# Patient Record
Sex: FEMALE | Race: WHITE | Employment: UNEMPLOYED | URBAN - METROPOLITAN AREA
[De-identification: names, ages, dates, MRNs, and addresses within clinical notes are randomized per-mention and may not be internally consistent; named-entity substitution may affect disease eponyms.]

---

## 2018-08-07 ENCOUNTER — HOSPITAL ENCOUNTER (EMERGENCY)
Age: 38
Discharge: HOME | End: 2018-08-07
Attending: EMERGENCY MEDICINE
Payer: COMMERCIAL

## 2018-08-07 VITALS
RESPIRATION RATE: 20 BRPM | OXYGEN SATURATION: 100 % | TEMPERATURE: 97.9 F | BODY MASS INDEX: 23.73 KG/M2 | HEIGHT: 57 IN | HEART RATE: 62 BPM | WEIGHT: 110 LBS | SYSTOLIC BLOOD PRESSURE: 113 MMHG | DIASTOLIC BLOOD PRESSURE: 67 MMHG

## 2018-08-07 DIAGNOSIS — L02.91 ABSCESS: Primary | ICD-10-CM

## 2018-08-07 PROCEDURE — 99282 EMERGENCY DEPT VISIT SF MDM: CPT

## 2018-08-07 PROCEDURE — 10060 I&D ABSCESS SIMPLE/SINGLE: CPT

## 2018-08-07 PROCEDURE — 6370000000 HC RX 637 (ALT 250 FOR IP)

## 2018-08-07 RX ORDER — LIDOCAINE AND PRILOCAINE 25; 25 MG/G; MG/G
CREAM TOPICAL ONCE
Status: DISCONTINUED | OUTPATIENT
Start: 2018-08-07 | End: 2018-08-07 | Stop reason: CLARIF

## 2018-08-07 RX ORDER — LIDOCAINE AND PRILOCAINE 25; 25 MG/G; MG/G
CREAM TOPICAL ONCE
Status: DISCONTINUED | OUTPATIENT
Start: 2018-08-07 | End: 2018-08-07 | Stop reason: HOSPADM

## 2018-08-07 RX ORDER — BUPRENORPHINE AND NALOXONE 8; 2 MG/1; MG/1
1 FILM, SOLUBLE BUCCAL; SUBLINGUAL DAILY
COMMUNITY
End: 2018-08-07 | Stop reason: ALTCHOICE

## 2018-08-07 RX ORDER — LIDOCAINE AND PRILOCAINE 25; 25 MG/G; MG/G
CREAM TOPICAL
Status: COMPLETED
Start: 2018-08-07 | End: 2018-08-07

## 2018-08-07 RX ORDER — SULFAMETHOXAZOLE AND TRIMETHOPRIM 800; 160 MG/1; MG/1
1 TABLET ORAL 2 TIMES DAILY
Qty: 20 TABLET | Refills: 0 | Status: SHIPPED | OUTPATIENT
Start: 2018-08-07 | End: 2018-08-17

## 2018-08-07 RX ORDER — BUPRENORPHINE HYDROCHLORIDE AND NALOXONE HYDROCHLORIDE DIHYDRATE 8; 2 MG/1; MG/1
1.5 TABLET SUBLINGUAL DAILY
COMMUNITY
End: 2022-08-21 | Stop reason: ALTCHOICE

## 2018-08-07 RX ORDER — CEPHALEXIN 250 MG/1
500 CAPSULE ORAL 4 TIMES DAILY
Qty: 80 CAPSULE | Refills: 0 | Status: SHIPPED | OUTPATIENT
Start: 2018-08-07 | End: 2018-08-17

## 2018-08-07 RX ADMIN — LIDOCAINE AND PRILOCAINE 1 AMPULE: 25; 25 CREAM TOPICAL at 08:48

## 2018-08-07 ASSESSMENT — PAIN DESCRIPTION - PAIN TYPE: TYPE: ACUTE PAIN

## 2018-08-07 ASSESSMENT — ENCOUNTER SYMPTOMS
PHOTOPHOBIA: 0
SHORTNESS OF BREATH: 0
SORE THROAT: 0
WHEEZING: 0
SINUS PRESSURE: 0
CONSTIPATION: 0
COUGH: 0
VOMITING: 0
DIARRHEA: 0
SINUS PAIN: 0
ABDOMINAL PAIN: 0
NAUSEA: 0
BACK PAIN: 0
RHINORRHEA: 0

## 2018-08-07 ASSESSMENT — PAIN SCALES - GENERAL: PAINLEVEL_OUTOF10: 6

## 2018-08-07 ASSESSMENT — PAIN DESCRIPTION - ORIENTATION: ORIENTATION: RIGHT

## 2018-08-07 ASSESSMENT — PAIN SCALES - WONG BAKER: WONGBAKER_NUMERICALRESPONSE: 6

## 2018-08-07 ASSESSMENT — PAIN DESCRIPTION - DESCRIPTORS: DESCRIPTORS: ACHING

## 2018-08-07 ASSESSMENT — PAIN DESCRIPTION - LOCATION: LOCATION: WRIST

## 2018-08-07 NOTE — ED PROVIDER NOTES
Patient is a 44 y/o female who presents with a chief complaint of abscess. Patient admits to a history of IVDA, she last injected 2 days ago into her right firearm. She now has an abscess of the right distal forearm. Patient states it has been present for 2 days and has increased in size and has more pain. Patient denies any fevers, chills, paresthesias of the right arm. She has not taken anything for the pain. The history is provided by the patient. No  was used. Review of Systems   Constitutional: Negative for chills, fatigue and fever. HENT: Negative for congestion, rhinorrhea, sinus pain, sinus pressure, sneezing and sore throat. Eyes: Negative for photophobia and visual disturbance. Respiratory: Negative for cough, shortness of breath and wheezing. Cardiovascular: Negative for chest pain and palpitations. Gastrointestinal: Negative for abdominal pain, constipation, diarrhea, nausea and vomiting. Genitourinary: Negative for dysuria, frequency and hematuria. Musculoskeletal: Negative for back pain, neck pain and neck stiffness. Skin: Positive for wound. Neurological: Negative for dizziness, light-headedness and headaches. Psychiatric/Behavioral: Negative for agitation, behavioral problems and confusion. Physical Exam   Constitutional: She is oriented to person, place, and time. She appears well-developed and well-nourished. No distress. HENT:   Head: Normocephalic and atraumatic. Mouth/Throat: Oropharynx is clear and moist. No oropharyngeal exudate. Eyes: Conjunctivae are normal. Pupils are equal, round, and reactive to light. Right eye exhibits no discharge. Left eye exhibits no discharge. No scleral icterus. Neck: Normal range of motion. Neck supple. Cardiovascular: Normal rate, regular rhythm and normal heart sounds. Exam reveals no gallop. No murmur heard.   Pulmonary/Chest: Effort normal and breath sounds normal. No respiratory discussed in detail and they are aware of the specific conditions for emergent return, as well as the importance of follow-up. MDM:        New Prescriptions    CEPHALEXIN (KEFLEX) 250 MG CAPSULE    Take 2 capsules by mouth 4 times daily for 10 days    SULFAMETHOXAZOLE-TRIMETHOPRIM (BACTRIM DS) 800-160 MG PER TABLET    Take 1 tablet by mouth 2 times daily for 10 days       Diagnosis:  1. Abscess        Disposition:  Patient's disposition: Discharge to home  Patient's condition is stable. ATTENDING PROVIDER ATTESTATION:     I have personally performed and/or participated in the history, exam, medical decision making, and procedures and agree with all pertinent clinical information unless otherwise noted. I have also reviewed and agree with the past medical, family and social history unless otherwise noted. I have discussed this patient in detail with the resident and provided the instruction and education regarding the evidence-based evaluation and treatment of Other (right wrist swelling and redness possible cyst.)    History: patient presents with abscess overlying the dorsal right wrist.  Patient admits to IV drug abuse. She denies fevers or chills. My findings: Angel Fallon is a 45 y.o. female whom is in no distress. Physical exam reveals 2cm x 2 cm raised erythematous fluctuant abscess of the distal right wrist.  No erythema tracing up the arm. Heart RRR, lungs CTA. No murmurs. My plan: Symptomatic and supportive care. Abscess drained and patient started on antibiotics. She is to have close outpatient follow up.     Electronically signed by Peter Barnett DO on 8/7/18 at 10:14 AM             Peter Barnett DO  08/07/18 0909

## 2018-09-16 ENCOUNTER — HOSPITAL ENCOUNTER (EMERGENCY)
Age: 38
Discharge: HOME | End: 2018-09-16
Attending: EMERGENCY MEDICINE
Payer: COMMERCIAL

## 2018-09-16 VITALS
SYSTOLIC BLOOD PRESSURE: 107 MMHG | BODY MASS INDEX: 23.95 KG/M2 | DIASTOLIC BLOOD PRESSURE: 70 MMHG | HEIGHT: 57 IN | RESPIRATION RATE: 16 BRPM | HEART RATE: 88 BPM | WEIGHT: 111 LBS | TEMPERATURE: 97.9 F | OXYGEN SATURATION: 97 %

## 2018-09-16 DIAGNOSIS — L02.212 ABSCESS OF BACK: Primary | ICD-10-CM

## 2018-09-16 PROCEDURE — 99282 EMERGENCY DEPT VISIT SF MDM: CPT

## 2018-09-16 PROCEDURE — G0381 LEV 2 HOSP TYPE B ED VISIT: HCPCS

## 2018-09-16 RX ORDER — IBUPROFEN 600 MG/1
600 TABLET ORAL EVERY 8 HOURS PRN
Qty: 30 TABLET | Refills: 0 | Status: SHIPPED | OUTPATIENT
Start: 2018-09-16 | End: 2022-08-21 | Stop reason: ALTCHOICE

## 2018-09-16 RX ORDER — DOXYCYCLINE 100 MG/1
100 CAPSULE ORAL 2 TIMES DAILY
Qty: 20 CAPSULE | Refills: 0 | Status: SHIPPED | OUTPATIENT
Start: 2018-09-16 | End: 2022-08-21 | Stop reason: ALTCHOICE

## 2018-09-16 ASSESSMENT — PAIN DESCRIPTION - DESCRIPTORS: DESCRIPTORS: ACHING;THROBBING;SORE;TENDER

## 2018-09-16 ASSESSMENT — PAIN SCALES - GENERAL: PAINLEVEL_OUTOF10: 5

## 2018-09-16 ASSESSMENT — PAIN DESCRIPTION - LOCATION: LOCATION: BACK

## 2018-09-16 ASSESSMENT — PAIN DESCRIPTION - FREQUENCY: FREQUENCY: CONTINUOUS

## 2018-09-16 ASSESSMENT — PAIN DESCRIPTION - PROGRESSION: CLINICAL_PROGRESSION: NOT CHANGED

## 2018-09-16 ASSESSMENT — PAIN DESCRIPTION - PAIN TYPE: TYPE: SUPERFICIAL SOMATIC PAIN

## 2018-09-16 ASSESSMENT — PAIN DESCRIPTION - ONSET: ONSET: ON-GOING

## 2018-09-16 NOTE — ED PROVIDER NOTES
HPI:  9/16/18,   Time: 7:12 PM         Blanche Chu is a 45 y.o. female presenting to the ED for redness and swelling on left side of upper back that has drained, beginning 4 days ago. The complaint has been persistent, moderate in severity, and worsened by nothing. ROS:   Pertinent positives and negatives are stated within HPI, all other systems reviewed and are negative.  --------------------------------------------- PAST HISTORY ---------------------------------------------  Past Medical History:  has a past medical history of Hyperlipemia; Substance abuse; and Varicose vein. Past Surgical History:  has a past surgical history that includes Tubal ligation (2013 March). Social History:  reports that she has been smoking Cigarettes. She has a 7.00 pack-year smoking history. She uses smokeless tobacco. She reports that she uses drugs. She reports that she does not drink alcohol. Family History: family history includes COPD in her paternal grandfather; Diabetes in her paternal grandmother and other family members; Heart Disease in her father. The patients home medications have been reviewed. Allergies: Patient has no known allergies. -------------------------------------------------- RESULTS -------------------------------------------------  All laboratory and radiology results have been personally reviewed by myself   LABS:  No results found for this visit on 09/16/18. RADIOLOGY:  Interpreted by Radiologist.  No orders to display       ------------------------- NURSING NOTES AND VITALS REVIEWED ---------------------------   The nursing notes within the ED encounter and vital signs as below have been reviewed.    /70   Pulse 88   Temp 97.9 °F (36.6 °C) (Oral)   Resp 16   Ht 4' 9\" (1.448 m)   Wt 111 lb (50.3 kg)   LMP 08/27/2018   SpO2 97%   BMI 24.02 kg/m²   Oxygen Saturation Interpretation: Normal      ---------------------------------------------------PHYSICAL Discharge to home  Patient condition is good                  Lewis Reeder MD  09/16/18 [id number]

## 2019-05-29 ENCOUNTER — HOSPITAL ENCOUNTER (OUTPATIENT)
Age: 39
Discharge: HOME | End: 2019-05-31

## 2019-05-29 LAB — PROCALCITONIN: 0.05 NG/ML (ref 0–0.08)

## 2019-05-29 PROCEDURE — 84145 PROCALCITONIN (PCT): CPT

## 2022-04-13 ENCOUNTER — HOSPITAL ENCOUNTER (OUTPATIENT)
Dept: PSYCHIATRY | Age: 42
Setting detail: THERAPIES SERIES
Discharge: HOME | End: 2022-04-13
Payer: COMMERCIAL

## 2022-04-13 PROCEDURE — 90791 PSYCH DIAGNOSTIC EVALUATION: CPT

## 2022-04-13 PROCEDURE — 80305 DRUG TEST PRSMV DIR OPT OBS: CPT

## 2022-04-13 ASSESSMENT — ANXIETY QUESTIONNAIRES
1. FEELING NERVOUS, ANXIOUS, OR ON EDGE: 2
GAD7 TOTAL SCORE: 15
2. NOT BEING ABLE TO STOP OR CONTROL WORRYING: 3
7. FEELING AFRAID AS IF SOMETHING AWFUL MIGHT HAPPEN: 3
3. WORRYING TOO MUCH ABOUT DIFFERENT THINGS: 3
5. BEING SO RESTLESS THAT IT IS HARD TO SIT STILL: 2
4. TROUBLE RELAXING: 1
6. BECOMING EASILY ANNOYED OR IRRITABLE: 1
IF YOU CHECKED OFF ANY PROBLEMS ON THIS QUESTIONNAIRE, HOW DIFFICULT HAVE THESE PROBLEMS MADE IT FOR YOU TO DO YOUR WORK, TAKE CARE OF THINGS AT HOME, OR GET ALONG WITH OTHER PEOPLE: SOMEWHAT DIFFICULT

## 2022-04-13 ASSESSMENT — PATIENT HEALTH QUESTIONNAIRE - PHQ9: SUM OF ALL RESPONSES TO PHQ QUESTIONS 1-9: 10

## 2022-04-13 ASSESSMENT — LIFESTYLE VARIABLES: HISTORY_ALCOHOL_USE: YES

## 2022-04-13 NOTE — PLAN OF CARE
[facility]- Level of Care Placement      [x]Admissions  []Continued Stay []Discharge/Transfer / Complication in [address] AB:4/23/7581          Level of Care Level 1      Outpatient Services Level 2.1   Intensive Outpatient Services(IOP) Level 2.5   Partial Hospitalization Services Level 3.1 CLINICALLY Managed Low-Intensity Residential Services Level 3.3  CLINICALLY Managed Population- Specific High- Intensity Residential Services Level 3.5  CLINICALLY Managed High Intensive Residential Services Level 3.7  MEDICALLY Monitored Intensive Inpatient Services Level 4  MEDICALLY Managed Intensive Inpatient Services   Dimension 1  Acute Intoxication and/or Withdrawal Potential [] Not experiencing significant withdrawal    [] Minimal  risk of severe  withdrawal [x] Minimal  risk of severe  withdrawal    [] Manageable  at Level 2-WM [] Moderate  risk of severe withdrawal    [] Manageable at Level 2-WM [] No withdrawal risk or minimal or stable withdrawal     [] Concurrently receiving Level -WM or Level 2-WM services [] Minimal risk of severe withdrawal    [] If withdrawal is present, manageable at Level 3. 2-WM  [] Minimal risk of severe withdrawal    [] If withdrawal is present manageable at Level 3. 2-WM [] High risk of withdrawal, but manageable at Level  3.7-WM and does not require  full resources of a licensed hospital [] At high risk of withdrawal and requires Level 4-WM and full resources of licensed hospital    COMMENTS:           Dimension 2   Biomedical Conditions and Complications  (BMC/C) [x] None or very stable    [] Receiving concurrent medical monitoring  [] None or not a distraction from treatment    [] Problems are manageable at Level 2.1 [] None or not sufficient to distract from treatment    [] Problems are manageable at  Level 2.5 [] None or stable    [] Receiving concurrent medical monitoring  [] None or stable    [] Receiving concurrent medical monitoring  [] None or stable    [] Receiving concurrent medical monitoring [] Requires 24-hour medical monitoring  but not intensive treatment [] Requires 24-hour medical & nursing care and the full  resources of a licensed hospital   COMMENTS:           Dimension 3  Emotional,  Behavioral,  or Cognitive Conditions and Complications   (EBC/C) [] None or very stable    [] Receiving concurrent mental health monitoring [x] Mild severity  with potential to distract from recovery; needs monitoring [] Mild to moderate severity, with potential to distract from recovery, needs stabilization [] None or minimal; not distracting to recovery    [] If  stable, a    co-occurring capable program is appropriate    [] If not stable, a co-occurring enhanced program is required [] Mild to moderate severity; needs structure to focus on recovery. Treatment should be designed to address significant cognitive deficits     [] If  stable, a  co-occurring capable program is appropriate    [] If not stable, a co-occurring enhanced program is required [] Demonstrates repeated inability to control impulses or unstable & dangerous signs/ symptoms require stabilization. Other functional deficits require stabilization and 24-hr.  setting to prepare for community integration  & continuing care    [] Co-occurring enhanced setting is required for those with severe & chronic mental illness [] Moderate severity;  needs 24-hour   structured setting    [] If client has co-occurring mental disorder, requires concurrent mental health services in a medically monitored setting  [] Severe and unstable problems;  requires 24-hour psychiatric care  with concomitant addiction treatment   COMMENTS:             Electronically signed by Elicia Moreno [address] on 5/55/3064 at 12:37 PM                   [address] Placement      [x]Admissions  []Continued Stay []Discharge/Transfer / Complication in [address] [place] [address] 153 GQUI:6/51/5952          Level of Care Level 1  Outpatient   Services Level 2.1  Intensive  Outpatient  Services Level 2.5  Partial Hospitalization Services Level 3.1  CLINICALLY Managed Low-intensity Residential Services Level 3.3  CLINICALLY Managed Population- Specific High- Intensity Residential Services Level 3.5  CLINICALLY Managed High-Intensive Residential Services Level 3.7  MEDICALLY Monitored Intensive Inpatient Services Level 4  MEDICALLY Managed Intensive Inpatient Services   Dimension 4  Readiness  To  Change [] Ready for recovery but needs motivating and monitoring strategies to strengthen readiness    []Needs ongoing monitoring and disease management    [] High severity in this dimension but not in other dimensions. Needs Level 1 motivational enhancement strategies   [x] Has variable engagement in treatment, ambivalence, or lack of awareness of substance use or mental health problems and requires structured program several times/wk. to promote progress through stages of change [] Has poor engagement in treatment, significant ambivalence, or lack of awareness of substance use or mental health problems, requires near daily structured program or intensive engagement to promote progress through stages of change [] Open to recovery, but needs structured environment to maintain therapeutic gains [] Has little awareness & needs interventions at Level 3.3 to engage & stay in treatment.     [] If there is high severity in this dimension, but not in any other dimension, motivational enhancement strategies should be provided in Level 1 [] Has marked difficulty with, or opposition to treatment with dangerous consequences    [] If there is high severity in this dimension, but not in any other dimension, motivational enhancement strategies should be provided in Level 1 [] Low interest in treatment and impulse control is poor despite negative consequences;  needs motivating strategies only safely available in 24-hour structured setting    [] If there is high severity in this dimension, but not in any other dimension, motivational enhancement strategies should be provided in Level 1 [] Problems in this dimension do not qualify the client for Level 4 services    [] If the client's only severity is in Dimension 4,5, and/or 6 without high severity in Dimensions 1,2, and/or 3, then client is not qualified for Level 4   COMMENTS:           Dimension 5  Relapse, Continued Use or Continued Problem Potential  [] Able to maintain abstinence or control use and/or addictive behaviors  and pursue recovery or motivational goals with minimal support [x] Intensification of addiction or mental health symptoms indicate high likelihood of relapse risk or continued use or continued problems without  close monitoring and support several times/wk.  [] Intensification of addiction or mental health symptoms, despite active participation in a Level 1 or 2.1 program, indicates high likelihood of relapse or continued use or continued problems without near-daily monitoring [] Understands relapse but needs structure to maintain therapeutic gains  [] Has little awareness and needs interventions available only at Level 3.3 to prevent continued use, with imminent dangerous consequences, because of cognitive deficits or  comparable dysfunction  [] Has no recognition  of the skills needed to prevent continued use,  with imminently dangerous  consequences [] Unable to control use, with imminently dangerous consequences,  despite active participation at less intensive levels of care [] Problems in this dimension do not qualify the client for Level 4 services    [] If the client's only severity is in Dimension 4,5, and/or 6 without high severity in Dimensions 1,2, and/or 3, then client is not qualified for Level 4   COMMENTS:           Dimension 6  Recovery/Living Environment []  Recovery environment is supportive and/or the client has skills to cope [x] Recovery environment is not supportive  but with structure and support, the client can cope [] Recovery environment is not supportive, but with structure and support and relief from the home environment, client can cope [] Environment    is dangerous, but recovery is achievable if Level 3.1 24-hour structure is available  [] Environment is dangerous and  client needs 24-hour structure to learn to cope [] Environment is dangerous, and the client lacks skills to cope outside of a  highly structured 24-hour setting   [] Environment is dangerous, and the client lacks skills to cope outside of a  highly structured 24-hour setting [] Problems in this dimension do not qualify the client for Level 4 services    [] If the client's only severity is in Dimension 4,5, and/or 6 without high severity in Dimensions 1,2, and/or 3, then client is not qualified for Level 4   COMMENTS:               Electronically signed by Kaley Munguia Moca on 5/30/1766 at 12:39 PM

## 2022-04-13 NOTE — CARE COORDINATION
Biopsychosocial Assessment Note    Name: Mp Miles  Date: 4/13/2022  Start Time: 56  End Time: 3844    [address]  met with patient to complete the biopsychosocial assessment and CSSR-S. Mental Status Exam: orientedx4. Client presented anxious ,nodding out at times appeared to be under the influence. Presenting Problem: Client reported heroin dependence and stated she tested positive at probation on 4/11/2022 for opiates and was referred by [facility] to have an AOD assessment    Patient Report and Notes:  Client reported heroin dependence and stated she tested positive at probation on 4/11/2022 for opiates and was referred by [facility] to have an AOD assessment    Gender  [] Male [x] Female [] Transgender  [] Other    Sexual Orientation    [x] Heterosexual [] Homosexual [] Bisexual [] Other    Suicidal Ideation  [] Reports   [x] Denies    Homicidal Ideation  [] Reports   [x] Denies      Hallucinations/Delusions   [] Reports   [x] Denies     Substance Use/Alcohol Use/Addiction  [x] Reports :     Substance Use     Use of substances  Yes   Substance 1     Substance used Tobacco   Amount/frequency/route Client smokes 20 cigarettes a day   Age of first use 6   Last use/History 4/13/2022   Substance 2     Substance used Heroin   Amount/frequency/route snort/ IV daily 10 to 100 dollars over the past year   Age of first use 29   Last use/History 4/6/2022   Substance 3     Substance used Alcohol   Amount/frequency/route drinks 1 shot of liquor socially with peers over the past year   Age of first use 6   Last use/History 1/1/22   Substance 4     Substance used Cocaine   Amount/frequency/route Client snorted 40 dollars 3 times a year .    Age of first use 19's   Last use/History 2019   Substance 5     Substance used Opiates   Amount/frequency/route Client used 2-3 pills not prescribed for back pain 2008 to 2009   Age of first use 20's   Last use/History 2009   Substance 6     Substance used Marijuana   Amount/frequency/route smokes 1 blunt every other day over the past year   Age of first use 12   Last use/History 3/26/2022       [] Denies     Trauma History  [] Reports    [x] Denies     Plan of Care: Maintain Abstinence and address mental health issues    Patient Goal: \" I want to get off probation and stay sober\"     Patient PHQ 9 Score: 10- Referred to Dual Program  Interpretation of Total Score Depression Severity: 1-4 = Minimal depression, 5-9 = Mild depression, 10-14 = Moderate depression, 15-19 = Moderately severe depression, 20-27 = Severe depression    Preliminary Diagnosis and Criteria: 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

## 2022-04-13 NOTE — FLOWSHEET NOTE
At age 15 - client reported she took orally a lot of Tylenol but cannot recall why currently, however it was a past attempt for suicide.     Electronically signed by Elicia Schroeder [address] on 5/24/2204 at 10:44 AM

## 2022-04-14 NOTE — CARE COORDINATION
[facility] that client has tested positive for fentanyl , cocaine, amphetamines, and ecstasy on multiple occasions over the past (2) weeks. Probation stated client was told prior before asseessment to do inpatient care and she refused the order, and she is being mandated to inpatient care by probation. Probation's recommendation for inpatient is supported and probation was made aware when client completes inpatient care she may return to the [place] level of care.     Electronically signed by Elicia Champagne [address] on 8/52/6329 at 3:43 PM

## 2022-04-19 ENCOUNTER — HOSPITAL ENCOUNTER (OUTPATIENT)
Dept: PSYCHIATRY | Age: 42
Setting detail: THERAPIES SERIES
Discharge: HOME | End: 2022-04-19
Payer: COMMERCIAL

## 2022-04-21 ENCOUNTER — HOSPITAL ENCOUNTER (OUTPATIENT)
Dept: PSYCHIATRY | Age: 42
Setting detail: THERAPIES SERIES
Discharge: HOME | End: 2022-04-21
Payer: COMMERCIAL

## 2022-04-26 ENCOUNTER — APPOINTMENT (OUTPATIENT)
Dept: PSYCHIATRY | Age: 42
End: 2022-04-26
Payer: COMMERCIAL

## 2022-04-28 ENCOUNTER — APPOINTMENT (OUTPATIENT)
Dept: PSYCHIATRY | Age: 42
End: 2022-04-28
Payer: COMMERCIAL

## 2022-08-21 ENCOUNTER — APPOINTMENT (OUTPATIENT)
Dept: GENERAL RADIOLOGY | Age: 42
DRG: 469 | End: 2022-08-21
Payer: COMMERCIAL

## 2022-08-21 ENCOUNTER — HOSPITAL ENCOUNTER (INPATIENT)
Age: 42
LOS: 2 days | Discharge: HOME | DRG: 469 | End: 2022-08-24
Attending: EMERGENCY MEDICINE | Admitting: INTERNAL MEDICINE
Payer: COMMERCIAL

## 2022-08-21 ENCOUNTER — APPOINTMENT (OUTPATIENT)
Dept: CT IMAGING | Age: 42
DRG: 469 | End: 2022-08-21
Payer: COMMERCIAL

## 2022-08-21 DIAGNOSIS — R77.8 ELEVATED TROPONIN: ICD-10-CM

## 2022-08-21 DIAGNOSIS — N17.9 ACUTE KIDNEY INJURY (HCC): ICD-10-CM

## 2022-08-21 DIAGNOSIS — R11.2 NAUSEA AND VOMITING, INTRACTABILITY OF VOMITING NOT SPECIFIED, UNSPECIFIED VOMITING TYPE: ICD-10-CM

## 2022-08-21 DIAGNOSIS — E86.0 DEHYDRATION: ICD-10-CM

## 2022-08-21 DIAGNOSIS — R94.31 PROLONGED QT INTERVAL: ICD-10-CM

## 2022-08-21 DIAGNOSIS — K85.90 ACUTE PANCREATITIS, UNSPECIFIED COMPLICATION STATUS, UNSPECIFIED PANCREATITIS TYPE: ICD-10-CM

## 2022-08-21 DIAGNOSIS — F11.93 OPIATE WITHDRAWAL (HCC): Primary | ICD-10-CM

## 2022-08-21 DIAGNOSIS — E83.39 HYPERPHOSPHATEMIA: ICD-10-CM

## 2022-08-21 DIAGNOSIS — E87.8 HYPOCHLOREMIA: ICD-10-CM

## 2022-08-21 DIAGNOSIS — R94.31 ACUTE ELECTROCARDIOGRAM CHANGES: ICD-10-CM

## 2022-08-21 LAB
ALBUMIN SERPL-MCNC: 6.7 G/DL (ref 3.5–5.2)
ALP BLD-CCNC: 109 U/L (ref 35–104)
ALT SERPL-CCNC: 19 U/L (ref 0–32)
ANION GAP SERPL CALCULATED.3IONS-SCNC: 34 MMOL/L (ref 7–16)
AST SERPL-CCNC: 19 U/L (ref 0–31)
BASOPHILS ABSOLUTE: 0 E9/L (ref 0–0.2)
BASOPHILS RELATIVE PERCENT: 0.4 % (ref 0–2)
BILIRUB SERPL-MCNC: 0.5 MG/DL (ref 0–1.2)
BUN BLDV-MCNC: 73 MG/DL (ref 6–20)
CALCIUM SERPL-MCNC: 11.4 MG/DL (ref 8.6–10.2)
CHLORIDE BLD-SCNC: 69 MMOL/L (ref 98–107)
CO2: 38 MMOL/L (ref 22–29)
CREAT SERPL-MCNC: 4 MG/DL (ref 0.5–1)
EOSINOPHILS ABSOLUTE: 0 E9/L (ref 0.05–0.5)
EOSINOPHILS RELATIVE PERCENT: 0.1 % (ref 0–6)
GFR AFRICAN AMERICAN: 15
GFR NON-AFRICAN AMERICAN: 12 ML/MIN/1.73
GLUCOSE BLD-MCNC: 174 MG/DL (ref 74–99)
HCT VFR BLD CALC: 60.8 % (ref 34–48)
HEMOGLOBIN: 20.6 G/DL (ref 11.5–15.5)
LACTIC ACID: 7.5 MMOL/L (ref 0.5–2.2)
LIPASE: 185 U/L (ref 13–60)
LIPASE: 96 U/L (ref 13–60)
LYMPHOCYTES ABSOLUTE: 4.35 E9/L (ref 1.5–4)
LYMPHOCYTES RELATIVE PERCENT: 19.3 % (ref 20–42)
MAGNESIUM: 3.3 MG/DL (ref 1.6–2.6)
MCH RBC QN AUTO: 29.3 PG (ref 26–35)
MCHC RBC AUTO-ENTMCNC: 33.9 % (ref 32–34.5)
MCV RBC AUTO: 86.6 FL (ref 80–99.9)
MONOCYTES ABSOLUTE: 1.6 E9/L (ref 0.1–0.95)
MONOCYTES RELATIVE PERCENT: 6.7 % (ref 2–12)
NEUTROPHILS ABSOLUTE: 16.95 E9/L (ref 1.8–7.3)
NEUTROPHILS RELATIVE PERCENT: 73.9 % (ref 43–80)
PDW BLD-RTO: 11.9 FL (ref 11.5–15)
PHOSPHORUS: 10.1 MG/DL (ref 2.5–4.5)
PLATELET # BLD: 554 E9/L (ref 130–450)
PMV BLD AUTO: 10.2 FL (ref 7–12)
POTASSIUM SERPL-SCNC: 4.4 MMOL/L (ref 3.5–5)
RBC # BLD: 7.02 E12/L (ref 3.5–5.5)
REASON FOR REJECTION: NORMAL
REJECTED TEST: NORMAL
SEDIMENTATION RATE, ERYTHROCYTE: 4 MM/HR (ref 0–20)
SODIUM BLD-SCNC: 141 MMOL/L (ref 132–146)
TOTAL CK: 64 U/L (ref 20–180)
TOTAL PROTEIN: 11.4 G/DL (ref 6.4–8.3)
TROPONIN, HIGH SENSITIVITY: 135 NG/L (ref 0–9)
TROPONIN, HIGH SENSITIVITY: 60 NG/L (ref 0–9)
WBC # BLD: 22.9 E9/L (ref 4.5–11.5)

## 2022-08-21 PROCEDURE — 93005 ELECTROCARDIOGRAM TRACING: CPT | Performed by: EMERGENCY MEDICINE

## 2022-08-21 PROCEDURE — 80307 DRUG TEST PRSMV CHEM ANLYZR: CPT

## 2022-08-21 PROCEDURE — 86706 HEP B SURFACE ANTIBODY: CPT

## 2022-08-21 PROCEDURE — 99285 EMERGENCY DEPT VISIT HI MDM: CPT

## 2022-08-21 PROCEDURE — 6360000002 HC RX W HCPCS: Performed by: EMERGENCY MEDICINE

## 2022-08-21 PROCEDURE — 86160 COMPLEMENT ANTIGEN: CPT

## 2022-08-21 PROCEDURE — 96361 HYDRATE IV INFUSION ADD-ON: CPT

## 2022-08-21 PROCEDURE — 51702 INSERT TEMP BLADDER CATH: CPT

## 2022-08-21 PROCEDURE — 85651 RBC SED RATE NONAUTOMATED: CPT

## 2022-08-21 PROCEDURE — 84100 ASSAY OF PHOSPHORUS: CPT

## 2022-08-21 PROCEDURE — 74176 CT ABD & PELVIS W/O CONTRAST: CPT

## 2022-08-21 PROCEDURE — 2580000003 HC RX 258: Performed by: EMERGENCY MEDICINE

## 2022-08-21 PROCEDURE — 83516 IMMUNOASSAY NONANTIBODY: CPT

## 2022-08-21 PROCEDURE — 2580000003 HC RX 258: Performed by: INTERNAL MEDICINE

## 2022-08-21 PROCEDURE — 36415 COLL VENOUS BLD VENIPUNCTURE: CPT

## 2022-08-21 PROCEDURE — 87040 BLOOD CULTURE FOR BACTERIA: CPT

## 2022-08-21 PROCEDURE — 6370000000 HC RX 637 (ALT 250 FOR IP): Performed by: STUDENT IN AN ORGANIZED HEALTH CARE EDUCATION/TRAINING PROGRAM

## 2022-08-21 PROCEDURE — 80179 DRUG ASSAY SALICYLATE: CPT

## 2022-08-21 PROCEDURE — 86038 ANTINUCLEAR ANTIBODIES: CPT

## 2022-08-21 PROCEDURE — 6360000002 HC RX W HCPCS: Performed by: STUDENT IN AN ORGANIZED HEALTH CARE EDUCATION/TRAINING PROGRAM

## 2022-08-21 PROCEDURE — 82306 VITAMIN D 25 HYDROXY: CPT

## 2022-08-21 PROCEDURE — 2580000003 HC RX 258: Performed by: STUDENT IN AN ORGANIZED HEALTH CARE EDUCATION/TRAINING PROGRAM

## 2022-08-21 PROCEDURE — C9113 INJ PANTOPRAZOLE SODIUM, VIA: HCPCS | Performed by: EMERGENCY MEDICINE

## 2022-08-21 PROCEDURE — 83735 ASSAY OF MAGNESIUM: CPT

## 2022-08-21 PROCEDURE — 86803 HEPATITIS C AB TEST: CPT

## 2022-08-21 PROCEDURE — 84484 ASSAY OF TROPONIN QUANT: CPT

## 2022-08-21 PROCEDURE — 87340 HEPATITIS B SURFACE AG IA: CPT

## 2022-08-21 PROCEDURE — 96372 THER/PROPH/DIAG INJ SC/IM: CPT

## 2022-08-21 PROCEDURE — 82550 ASSAY OF CK (CPK): CPT

## 2022-08-21 PROCEDURE — 36556 INSERT NON-TUNNEL CV CATH: CPT

## 2022-08-21 PROCEDURE — 82077 ASSAY SPEC XCP UR&BREATH IA: CPT

## 2022-08-21 PROCEDURE — 80053 COMPREHEN METABOLIC PANEL: CPT

## 2022-08-21 PROCEDURE — 85025 COMPLETE CBC W/AUTO DIFF WBC: CPT

## 2022-08-21 PROCEDURE — 80143 DRUG ASSAY ACETAMINOPHEN: CPT

## 2022-08-21 PROCEDURE — 71045 X-RAY EXAM CHEST 1 VIEW: CPT

## 2022-08-21 PROCEDURE — 83690 ASSAY OF LIPASE: CPT

## 2022-08-21 PROCEDURE — 96375 TX/PRO/DX INJ NEW DRUG ADDON: CPT

## 2022-08-21 PROCEDURE — 83970 ASSAY OF PARATHORMONE: CPT

## 2022-08-21 PROCEDURE — 02HV33Z INSERTION OF INFUSION DEVICE INTO SUPERIOR VENA CAVA, PERCUTANEOUS APPROACH: ICD-10-PCS | Performed by: EMERGENCY MEDICINE

## 2022-08-21 PROCEDURE — 82330 ASSAY OF CALCIUM: CPT

## 2022-08-21 PROCEDURE — 96374 THER/PROPH/DIAG INJ IV PUSH: CPT

## 2022-08-21 PROCEDURE — 83605 ASSAY OF LACTIC ACID: CPT

## 2022-08-21 RX ORDER — PANTOPRAZOLE SODIUM 40 MG/10ML
40 INJECTION, POWDER, LYOPHILIZED, FOR SOLUTION INTRAVENOUS ONCE
Status: COMPLETED | OUTPATIENT
Start: 2022-08-21 | End: 2022-08-21

## 2022-08-21 RX ORDER — CLONIDINE HYDROCHLORIDE 0.1 MG/1
0.1 TABLET ORAL EVERY 6 HOURS PRN
Status: DISCONTINUED | OUTPATIENT
Start: 2022-08-21 | End: 2022-08-25 | Stop reason: HOSPADM

## 2022-08-21 RX ORDER — SODIUM CHLORIDE 9 MG/ML
1000 INJECTION, SOLUTION INTRAVENOUS CONTINUOUS
Status: DISCONTINUED | OUTPATIENT
Start: 2022-08-21 | End: 2022-08-22

## 2022-08-21 RX ORDER — METHOCARBAMOL 500 MG/1
500 TABLET, FILM COATED ORAL 2 TIMES DAILY
COMMUNITY

## 2022-08-21 RX ORDER — CLONIDINE HYDROCHLORIDE 0.1 MG/1
0.1 TABLET ORAL ONCE
Status: DISCONTINUED | OUTPATIENT
Start: 2022-08-21 | End: 2022-08-21

## 2022-08-21 RX ORDER — HYDROXYZINE PAMOATE 25 MG/1
50 CAPSULE ORAL EVERY 8 HOURS PRN
Status: DISCONTINUED | OUTPATIENT
Start: 2022-08-21 | End: 2022-08-22

## 2022-08-21 RX ORDER — MAGNESIUM SULFATE IN WATER 40 MG/ML
2000 INJECTION, SOLUTION INTRAVENOUS ONCE
Status: DISCONTINUED | OUTPATIENT
Start: 2022-08-21 | End: 2022-08-21

## 2022-08-21 RX ORDER — 0.9 % SODIUM CHLORIDE 0.9 %
1000 INTRAVENOUS SOLUTION INTRAVENOUS ONCE
Status: COMPLETED | OUTPATIENT
Start: 2022-08-21 | End: 2022-08-21

## 2022-08-21 RX ORDER — NICOTINE 21 MG/24HR
1 PATCH, TRANSDERMAL 24 HOURS TRANSDERMAL DAILY
Status: DISCONTINUED | OUTPATIENT
Start: 2022-08-21 | End: 2022-08-25 | Stop reason: HOSPADM

## 2022-08-21 RX ORDER — PROMETHAZINE HYDROCHLORIDE 25 MG/1
25 TABLET ORAL EVERY 6 HOURS PRN
Status: DISCONTINUED | OUTPATIENT
Start: 2022-08-21 | End: 2022-08-21

## 2022-08-21 RX ORDER — MELOXICAM 7.5 MG/1
7.5 TABLET ORAL DAILY
COMMUNITY

## 2022-08-21 RX ORDER — TRAMADOL HYDROCHLORIDE 50 MG/1
50 TABLET ORAL PRN
Status: DISCONTINUED | OUTPATIENT
Start: 2022-08-21 | End: 2022-08-22

## 2022-08-21 RX ORDER — ONDANSETRON 2 MG/ML
4 INJECTION INTRAMUSCULAR; INTRAVENOUS ONCE
Status: COMPLETED | OUTPATIENT
Start: 2022-08-21 | End: 2022-08-21

## 2022-08-21 RX ADMIN — SODIUM CHLORIDE 1000 ML: 9 INJECTION, SOLUTION INTRAVENOUS at 22:18

## 2022-08-21 RX ADMIN — ONDANSETRON 4 MG: 2 INJECTION INTRAMUSCULAR; INTRAVENOUS at 19:04

## 2022-08-21 RX ADMIN — TRIMETHOBENZAMIDE HYDROCHLORIDE 200 MG: 100 INJECTION INTRAMUSCULAR at 21:30

## 2022-08-21 RX ADMIN — SODIUM CHLORIDE 1000 ML: 9 INJECTION, SOLUTION INTRAVENOUS at 22:19

## 2022-08-21 RX ADMIN — SODIUM CHLORIDE 1000 ML: 9 INJECTION, SOLUTION INTRAVENOUS at 22:08

## 2022-08-21 RX ADMIN — PANTOPRAZOLE SODIUM 40 MG: 40 INJECTION, POWDER, FOR SOLUTION INTRAVENOUS at 21:27

## 2022-08-21 RX ADMIN — SODIUM CHLORIDE 1000 ML: 9 INJECTION, SOLUTION INTRAVENOUS at 19:04

## 2022-08-21 ASSESSMENT — ENCOUNTER SYMPTOMS
CHEST TIGHTNESS: 0
WHEEZING: 0
NAUSEA: 1
CONSTIPATION: 0
COUGH: 0
VOMITING: 1
ABDOMINAL PAIN: 1
DIARRHEA: 1
BLOOD IN STOOL: 0

## 2022-08-21 ASSESSMENT — PAIN - FUNCTIONAL ASSESSMENT: PAIN_FUNCTIONAL_ASSESSMENT: NONE - DENIES PAIN

## 2022-08-21 NOTE — ED PROVIDER NOTES
Patient is in [facility] and is withdrawing from opiates (Fentanyl). She states she is shaking, diaphoretic, n/v/d and is hurting all over. She denies CP or SOB. The history is provided by the patient. Illness   The current episode started 3 to 5 days ago. The onset was gradual. The problem occurs continuously. The problem has been gradually worsening. The problem is moderate. Nothing relieves the symptoms. Nothing aggravates the symptoms. Associated symptoms include abdominal pain, diarrhea, nausea, vomiting and muscle aches. Pertinent negatives include no fever, no constipation, no headaches, no neck pain, no cough, no URI and no wheezing. She has been Less active. She has been Drinking less than usual and eating less than usual. Urine output has been normal. The last void occurred Less than 6 hours ago. There were no sick contacts. Review of Systems   Constitutional:  Positive for activity change, appetite change, chills and diaphoresis. Negative for fever. HENT: Negative. Eyes:  Negative for visual disturbance. Respiratory:  Negative for cough, chest tightness and wheezing. Cardiovascular: Negative. Negative for chest pain, palpitations and leg swelling. Gastrointestinal:  Positive for abdominal pain, diarrhea, nausea and vomiting. Negative for blood in stool and constipation. Genitourinary:  Negative for flank pain and hematuria. Musculoskeletal:  Negative for neck pain. Skin: Negative. Neurological:  Negative for syncope, weakness, light-headedness and headaches. Psychiatric/Behavioral:  Negative for suicidal ideas. The patient is nervous/anxious. Physical Exam  Vitals and nursing note reviewed. Constitutional:       Appearance: She is well-developed. She is ill-appearing. Comments: Frail and ill kempt   HENT:      Head: Normocephalic and atraumatic. Nose: Nose normal.      Mouth/Throat:      Mouth: Mucous membranes are dry. Pharynx: Oropharynx is clear. Eyes:      Extraocular Movements: Extraocular movements intact. Conjunctiva/sclera: Conjunctivae normal.      Pupils: Pupils are equal, round, and reactive to light. Cardiovascular:      Rate and Rhythm: Regular rhythm. Tachycardia present. Pulses: Normal pulses. Heart sounds: Normal heart sounds. No murmur heard. Pulmonary:      Effort: Pulmonary effort is normal. No respiratory distress. Breath sounds: Normal breath sounds. No wheezing or rales. Abdominal:      General: Bowel sounds are normal.      Palpations: Abdomen is soft. Tenderness: There is no abdominal tenderness. There is no guarding or rebound. Musculoskeletal:         General: No tenderness. Cervical back: Normal range of motion and neck supple. Right lower leg: No edema. Left lower leg: No edema. Skin:     General: Skin is warm and dry. Comments: Track marks   Neurological:      Mental Status: She is alert and oriented to person, place, and time. Cranial Nerves: No cranial nerve deficit. Coordination: Coordination normal.   Psychiatric:      Comments: Flat affect       Procedures    Central Line Placement Procedure Note    Indication: vascular access, poor peripheral access, hypovolemia, and need for frequent blood draws    Consent: The patient provided verbal consent for this procedure. Procedure: The patient was positioned appropriately and the skin over the right femoral vein was prepped with Chloraprep. Local anesthesia was obtained by infiltration using 1% Lidocaine without epinephrine. A large bore needle was used to identify the vein. A guide wire was then inserted into the vein through the needle. A triple lumen catheter was then inserted into the vessel over the guide wire using the Seldinger technique. All ports showed good, free flowing blood return and were flushed with saline solution. The catheter was then securely fastened to the skin with suture at 20 cm.   Two sutures were placed into the proximal eyelets and a suture end from each of the securing sutures was extended around the catheter and tied to the proximal eyelets as an added measure to prevent dislodgement. The site was covered with sterile dressing. Post procedure X-ray was not indicated. The patient tolerated the procedure well. Complications: None    Britney Goldmann EM MDM  Number of Diagnoses or Management Options  Acute electrocardiogram changes  Acute kidney injury ([facility] [address]  Acute pancreatitis, unspecified complication status, unspecified pancreatitis type  Dehydration  Elevated troponin  Hyperphosphatemia  Hypochloremia  Nausea and vomiting, intractability of vomiting not specified, unspecified vomiting type  Opiate withdrawal ([address]  Prolonged QT interval  Diagnosis management comments: Patient is a 80-year-old female coming from [facility] [place] today for opiate withdrawal type symptoms. She has had nausea and vomiting persistently for 4 to 5 days has not been able to keep anything down. She also related having dark coffee ground emesis. She also relates some generalized abdominal pain, shakiness, anxiousness. Patient is alert, oriented, ill-appearing, no distress however. Heart rate 109, /86, other vitals within normal limits. Afebrile. Patient overall ill-appearing, does have some dried blood in the right nostril, she states she has had some nosebleeding, none currently however. She did report vomiting up some blood. Heart rate tacky, regular, no murmurs appreciated. Lungs clear and equal bilaterally. Moderate generalized abdominal tenderness patient, nondistended, epigastric tenderness. Concern this time for dehydration, electrolyte abnormalities, pancreatitis, opiate withdrawal.  Initial COWS of 14. IV fluids, Protonix, zofran were given. COWS protocol.   Lab work was remarkable for positive anion gap metabolic acidosis, lactic 7.5, hypochloremia and hyper phosphatemia. Patient acute renal failure as well with creatinine 4.0, possibly related to severe dehydration. Sodium and potassium were within normal limits. Central line placed in right fem due to poor peripheral access and need for further IV fluid administration. Trops 135>60 in setting of ARF, EKG had ST depressions v3-v6, no elevations however, pt denies any CP, possibly demand related from her dehydration as well. Tigan was given for nausea, Mg and Calcium given as pts Qtc significantly prolonged at 672. Leukocytosis as well as hemoconcentration with elevated H/H appreciated as well. Blood Cx, UA pending. Lipase was elevated, along with pts epigastric tenderness, concerning for acute pancreatitis. CT abd was negative for signs of pancreatitis, however multifocal bilateral pneumatoceles were appreciated. Spoke with Helga Hein, NP ICU, discussed pt. Spoke with Dr. Alfred Long, nephrology. Spoke with  [facility], will admit to ICU for further care. Amount and/or Complexity of Data Reviewed  Decide to obtain previous medical records or to obtain history from someone other than the patient: yes      19:00  Patient had an episode of coffee ground emesis. Her repeat vitals indicate hypotension. Additional IVF have been ordered. EKG Interpretation    Interpreted by emergency department physician    Rhythm: sinus tachycardia  Rate: 100-110  Axis: normal  Ectopy: none  Conduction: short MS with long QTc 672ms   ST Segments: depression in  v3, v4, v5, and v6  T Waves: non specific changes  Q Waves: none    Clinical Impression: myocardial injury, no old for comparison          -------------------- ED COURSE & MEDS GIVEN --------------------  ED Course as of 08/22/22 0512   Sun Aug 21, 2022   2009 Patient relates she snorts heroin often and last use was Tuesday, 5 days ago, she relates she has had nausea and vomiting daily since and has not been able to keep much food or drink down.   She relates some generalized abdominal pain, mildly tender in the epigastric region. She does have some dried blood in the right nostril, possibly from previous nosebleeds. She also relates having some blood in the vomit, possibly swallowing blood from her nosebleed. No current nosebleed at this time. Oropharynx is dry, clear, nonerythematous. Lungs clear and equal bilaterally. No murmurs appreciated. Pt receiving IV fluids at this time. Initial EKG showed Qtc 672 with ST depressions in precordial leads. Mg and Calclium gluconate given. [PW]   2014 Initial cows 14 [PW]   2014 Hemoglobin Quant(!): 20.6 [PW]   2014 Lactic Acid(!!): 7.5 [PW]   2014 WBC(!): 22.9 [PW]   2018 Patient vomited again. Will guaiac. She is very heme concentrated from dehydration however with elevated WBC and LA will do blood cultures [JS]   2027 Lipase(!): 185 [PW]   2027 Pancreatitis [PW]   2027 Creatinine(!): 4.0  RIGO [PW]   2027 Anion Gap(!): 34 [PW]   2029 GFR Non-: 12  CT abd without contrast ordered. [PW]   2029 Tigan ordered for nausea [PW]   2046 Consult placed for nephrology [PW]   2106 Troponin, High Sensitivity(!): 135  Second pending. Elevated trop in setting of ARF. Pt denies any CP. [PW]   2122 Phosphorus(!!): 10.1 [PW]   2123 Spoke with Dr. Van Reis, discussed case, will place orders. [PW]   9832 Troponin, High Sensitivity(!): 60 [PW]   9904 Spoke with [facility], NP with ICU, made aware of pt. [PW]   Mon Aug 22, 2022   0048 CT ABDOMEN PELVIS WO CONTRAST Additional Contrast? None  IMPRESSION:  1. No CT evidence of acute pancreatitis on noncontrast evaluation. 2. Multifocal bilateral lower lung prominent pneumatoceles, as discussed  above. 3. Small hiatal hernia. [PW]   0690 Spoke with Dr. Rg, will admit patient to ICU for further care.  [PW]      ED Course User Index  [JS] Adelfo Rose, DO  [PW] Leela Weller, DO        Medications   cloNIDine (CATAPRES) tablet 0.1 mg (0 mg Oral Held 8/21/22 2407)   hydrOXYzine pamoate (VISTARIL) capsule 50 mg (has no administration in time range)   traMADol (ULTRAM) tablet 50 mg (has no administration in time range)   nicotine (NICODERM CQ) 14 MG/24HR 1 patch (1 patch TransDERmal Patch Applied 8/21/22 2134)   0.9 % sodium chloride infusion ( IntraVENous Rate/Dose Verify 8/22/22 0359)   melatonin disintegrating tablet 5 mg (has no administration in time range)   heparin (porcine) injection 5,000 Units (has no administration in time range)   sulfamethoxazole-trimethoprim (BACTRIM) 121.6 mg in dextrose 5 % 500 mL IVPB (has no administration in time range)   ondansetron (ZOFRAN) injection 4 mg (4 mg IntraVENous Given 8/21/22 1904)   0.9 % sodium chloride bolus (0 mLs IntraVENous Stopped 8/21/22 2008)   pantoprazole (PROTONIX) injection 40 mg (40 mg IntraVENous Given 8/21/22 2127)   0.9 % sodium chloride bolus (0 mLs IntraVENous Stopped 8/21/22 2349)   0.9 % sodium chloride bolus (0 mLs IntraVENous Stopped 8/21/22 2349)   trimethobenzamide (TIGAN) injection 200 mg (200 mg IntraMUSCular Given 8/21/22 2130)   melatonin disintegrating tablet 5 mg (5 mg Oral Given 8/22/22 0317)       -------------------- RESULTS --------------------    LABS:  Results for orders placed or performed during the hospital encounter of 08/21/22   CBC with Auto Differential   Result Value Ref Range    WBC 22.9 (H) 4.5 - 11.5 E9/L    RBC 7.02 (H) 3.50 - 5.50 E12/L    Hemoglobin 20.6 (H) 11.5 - 15.5 g/dL    Hematocrit 60.8 (H) 34.0 - 48.0 %    MCV 86.6 80.0 - 99.9 fL    MCH 29.3 26.0 - 35.0 pg    MCHC 33.9 32.0 - 34.5 %    RDW 11.9 11.5 - 15.0 fL    Platelets 419 (H) 771 - 450 E9/L    MPV 10.2 7.0 - 12.0 fL    Neutrophils % 73.9 43.0 - 80.0 %    Lymphocytes % 19.3 (L) 20.0 - 42.0 %    Monocytes % 6.7 2.0 - 12.0 %    Eosinophils % 0.1 0.0 - 6.0 %    Basophils % 0.4 0.0 - 2.0 %    Neutrophils Absolute 16.95 (H) 1.80 - 7.30 E9/L    Lymphocytes Absolute 4.35 (H) 1.50 - 4.00 E9/L    Monocytes Absolute 1.60 (H) 0.10 - 0.95 E9/L Eosinophils Absolute 0.00 (L) 0.05 - 0.50 E9/L    Basophils Absolute 0.00 0.00 - 0.20 E9/L   Comprehensive Metabolic Panel   Result Value Ref Range    Sodium 141 132 - 146 mmol/L    Potassium 4.4 3.5 - 5.0 mmol/L    Chloride 69 (LL) 98 - 107 mmol/L    CO2 38 (H) 22 - 29 mmol/L    Anion Gap 34 (H) 7 - 16 mmol/L    Glucose 174 (H) 74 - 99 mg/dL    BUN 73 (H) 6 - 20 mg/dL    Creatinine 4.0 (H) 0.5 - 1.0 mg/dL    GFR Non-African American 12 >=60 mL/min/1.73    GFR African American 15     Calcium 11.4 (H) 8.6 - 10.2 mg/dL    Total Protein 11.4 (H) 6.4 - 8.3 g/dL    Albumin 6.7 (H) 3.5 - 5.2 g/dL    Total Bilirubin 0.5 0.0 - 1.2 mg/dL    Alkaline Phosphatase 109 (H) 35 - 104 U/L    ALT 19 0 - 32 U/L    AST 19 0 - 31 U/L   Lipase   Result Value Ref Range    Lipase 185 (H) 13 - 60 U/L   Lactic Acid   Result Value Ref Range    Lactic Acid 7.5 (HH) 0.5 - 2.2 mmol/L   Troponin   Result Value Ref Range    Troponin, High Sensitivity 135 (H) 0 - 9 ng/L   CK   Result Value Ref Range    Total CK 64 20 - 180 U/L   URINE DRUG SCREEN   Result Value Ref Range    Amphetamine Screen, Urine NOT DETECTED Negative <1000 ng/mL    Barbiturate Screen, Ur NOT DETECTED Negative < 200 ng/mL    Benzodiazepine Screen, Urine NOT DETECTED Negative < 200 ng/mL    Cannabinoid Scrn, Ur NOT DETECTED Negative < 50ng/mL    Cocaine Metabolite Screen, Urine NOT DETECTED Negative < 300 ng/mL    Opiate Scrn, Ur NOT DETECTED Negative < 300ng/mL    PCP Screen, Urine NOT DETECTED Negative < 25 ng/mL    Methadone Screen, Urine NOT DETECTED Negative <300 ng/mL    Oxycodone Urine NOT DETECTED Negative <100 ng/mL    FENTANYL SCREEN, URINE POSITIVE (A) Negative <1 ng/mL    Drug Screen Comment: see below    Urinalysis with Microscopic   Result Value Ref Range    Color, UA Yellow Straw/Yellow    Clarity, UA SLCLOUDY Clear    Glucose, Ur Negative Negative mg/dL    Bilirubin Urine Negative Negative    Ketones, Urine TRACE (A) Negative mg/dL    Specific Gravity, UA 1.015 1.005 - 1.030    Blood, Urine MODERATE (A) Negative    pH, UA 7.5 5.0 - 9.0    Protein, UA 30 (A) Negative mg/dL    Urobilinogen, Urine 0.2 <2.0 E.U./dL    Nitrite, Urine Negative Negative    Leukocyte Esterase, Urine Negative Negative    Hyaline Casts, UA 0-2 0 - 2 /LPF    WBC, UA 1-3 0 - 5 /HPF    RBC, UA 0-1 0 - 2 /HPF    Epithelial Cells, UA FEW /HPF    Bacteria, UA NONE SEEN None Seen /HPF    Trichomonas, UA Present (A) None Seen /HPF   Magnesium   Result Value Ref Range    Magnesium 3.3 (H) 1.6 - 2.6 mg/dL   Phosphorus   Result Value Ref Range    Phosphorus 10.1 (HH) 2.5 - 4.5 mg/dL   URINE ELECTROLYTES   Result Value Ref Range    Sodium, Ur 86 Not Established mmol/L    Potassium, Ur 36.1 Not Established mmol/L    Chloride 25 Not Established mmol/L   SPECIMEN REJECTION   Result Value Ref Range    Rejected Test TRP5 SDS     Reason for Rejection see below    Troponin   Result Value Ref Range    Troponin, High Sensitivity 60 (H) 0 - 9 ng/L   Creatinine, Random Urine   Result Value Ref Range    Creatinine, Ur 53 29 - 226 mg/dL   Urea nitrogen, urine   Result Value Ref Range    Urea Nitrogen, Ur 608 (L) 800 - 1666 mg/dL   Lipase   Result Value Ref Range    Lipase 96 (H) 13 - 60 U/L   Sedimentation Rate   Result Value Ref Range    Sed Rate 4 0 - 20 mm/Hr   Serum Drug Screen   Result Value Ref Range    Ethanol Lvl <10 mg/dL    Acetaminophen Level <5.0 (L) 10.0 - 48.2 mcg/mL    Salicylate, Serum <1.5 0.0 - 30.0 mg/dL   Lactic Acid   Result Value Ref Range    Lactic Acid 1.5 0.5 - 2.2 mmol/L   POC Pregnancy Urine   Result Value Ref Range    HCG, Urine, POC Negative Negative    Lot Number [id number]     Positive QC Pass/Fail Pass     Negative QC Pass/Fail Pass    EKG 12 Lead   Result Value Ref Range    Ventricular Rate 106 BPM    Atrial Rate 106 BPM    P-R Interval 104 ms    QRS Duration 76 ms    Q-T Interval 506 ms    QTc Calculation (Bazett) 672 ms    P Axis 13 degrees    R Axis 83 degrees    T Axis 77 degrees RADIOLOGY:  CT ABDOMEN PELVIS WO CONTRAST Additional Contrast? None   Final Result   1. No CT evidence of acute pancreatitis on noncontrast evaluation. 2. Multifocal bilateral lower lung prominent pneumatoceles, as discussed   above. 3. Small hiatal hernia. XR CHEST PORTABLE   Final Result   No acute cardiopulmonary process. -------------------- NURSING NOTES & VITALS --------------------    The nursing notes within the ED encounter and vital signs have been reviewed. Patient Vitals for the past 8 hrs:   BP Temp Temp src Pulse Resp SpO2 Height Weight   08/22/22 0500 (!) 183/109 -- -- (!) 101 19 97 % -- --   08/22/22 0400 (!) 146/92 98.9 °F (37.2 °C) Oral 81 (!) 36 94 % -- --   08/22/22 0320 (!) 153/103 -- -- 85 -- -- -- --   08/22/22 0300 (!) 157/105 98.9 °F (37.2 °C) Oral 87 (!) 33 95 % -- --   08/22/22 0236 (!) 152/111 99 °F (37.2 °C) Oral 84 (!) 36 -- -- --   08/22/22 0235 -- -- -- -- -- -- 5' (1.524 m) 106 lb 11.2 oz (48.4 kg)   08/22/22 0130 (!) 155/99 -- -- 84 26 94 % -- --   08/22/22 0017 (!) 150/98 -- -- 82 (!) 40 98 % -- --   08/21/22 2350 (!) 171/97 -- -- 88 15 96 % -- --       Oxygen Saturation Interpretation: Normal      -------------------- PROGRESS NOTES --------------------  Counseling:  I have spoken with the patient and discussed todays results, in addition to providing specific details for the plan of care and counseling regarding the diagnosis and prognosis. Their questions are answered at this time and they are agreeable with the plan of admission.    -------------------- ADDITIONAL PROVIDER NOTES --------------------    Consultations:  Time: 0045. Spoke with Dr. Jessica Ramos. Discussed case. They will admit the patient.   This patient's ED course included: a personal history and physicial examination, re-evaluation prior to disposition, multiple bedside re-evaluations, IV medications, cardiac monitoring, continuous pulse oximetry, and complex medical decision making and emergency management      Diagnosis:  1. Opiate withdrawal ([facility] [address]    2. Acute pancreatitis, unspecified complication status, unspecified pancreatitis type    3. Dehydration    4. Nausea and vomiting, intractability of vomiting not specified, unspecified vomiting type    5. Acute kidney injury ([facility] [address]    6. Hypochloremia    7. Hyperphosphatemia    8. Elevated troponin    9. Prolonged QT interval    10. Acute electrocardiogram changes        Disposition:  Patient's disposition: Admit to CCU/ICU  Patient's condition is poor. Ranjeet Sandoval DO      *NOTE: This report was transcribed using voice recognition software. Every effort was made to ensure accuracy; however, inadvertent computerized transcription errors may be present.            Ranjeet Sandoval DO  Resident  08/22/22 4118

## 2022-08-22 ENCOUNTER — APPOINTMENT (OUTPATIENT)
Dept: CT IMAGING | Age: 42
DRG: 469 | End: 2022-08-22
Payer: COMMERCIAL

## 2022-08-22 PROBLEM — F11.93 OPIATE WITHDRAWAL (HCC): Status: ACTIVE | Noted: 2022-08-22

## 2022-08-22 PROBLEM — R79.89 ELEVATED TROPONIN: Status: ACTIVE | Noted: 2022-08-22

## 2022-08-22 PROBLEM — R77.8 ELEVATED TROPONIN: Status: ACTIVE | Noted: 2022-08-22

## 2022-08-22 PROBLEM — R94.31 PROLONGED QT INTERVAL: Status: ACTIVE | Noted: 2022-08-22

## 2022-08-22 PROBLEM — R94.31 EKG, ABNORMAL: Status: ACTIVE | Noted: 2022-08-22

## 2022-08-22 PROBLEM — N17.9 ACUTE KIDNEY INJURY (HCC): Status: ACTIVE | Noted: 2022-08-22

## 2022-08-22 PROBLEM — Z82.49 FAMILY HISTORY OF EARLY CAD: Status: ACTIVE | Noted: 2022-08-22

## 2022-08-22 LAB
ACETAMINOPHEN LEVEL: <5 MCG/ML (ref 10–30)
ALBUMIN SERPL-MCNC: 3.2 G/DL (ref 3.5–5.2)
ALBUMIN SERPL-MCNC: 3.3 G/DL (ref 3.5–5.2)
ALP BLD-CCNC: 55 U/L (ref 35–104)
ALP BLD-CCNC: 63 U/L (ref 35–104)
ALT SERPL-CCNC: 12 U/L (ref 0–32)
ALT SERPL-CCNC: 13 U/L (ref 0–32)
AMPHETAMINE SCREEN, URINE: NOT DETECTED
ANION GAP SERPL CALCULATED.3IONS-SCNC: 11 MMOL/L (ref 7–16)
ANION GAP SERPL CALCULATED.3IONS-SCNC: 12 MMOL/L (ref 7–16)
AST SERPL-CCNC: 21 U/L (ref 0–31)
AST SERPL-CCNC: 25 U/L (ref 0–31)
BACTERIA: ABNORMAL /HPF
BARBITURATE SCREEN URINE: NOT DETECTED
BENZODIAZEPINE SCREEN, URINE: NOT DETECTED
BILIRUB SERPL-MCNC: 0.4 MG/DL (ref 0–1.2)
BILIRUB SERPL-MCNC: 0.4 MG/DL (ref 0–1.2)
BILIRUBIN URINE: NEGATIVE
BLOOD, URINE: ABNORMAL
BUN BLDV-MCNC: 34 MG/DL (ref 6–20)
BUN BLDV-MCNC: 52 MG/DL (ref 6–20)
C3 COMPLEMENT: 131 MG/DL (ref 90–180)
C4 COMPLEMENT: 25 MG/DL (ref 10–40)
CALCIUM IONIZED: 1.03 MMOL/L (ref 1.15–1.33)
CALCIUM IONIZED: 1.14 MMOL/L (ref 1.15–1.33)
CALCIUM SERPL-MCNC: 8.4 MG/DL (ref 8.6–10.2)
CALCIUM SERPL-MCNC: 8.4 MG/DL (ref 8.6–10.2)
CANNABINOID SCREEN URINE: NOT DETECTED
CHLORIDE BLD-SCNC: 92 MMOL/L (ref 98–107)
CHLORIDE BLD-SCNC: 96 MMOL/L (ref 98–107)
CHLORIDE URINE RANDOM: 25 MMOL/L
CLARITY: ABNORMAL
CO2: 23 MMOL/L (ref 22–29)
CO2: 30 MMOL/L (ref 22–29)
COCAINE METABOLITE SCREEN URINE: NOT DETECTED
COLOR: YELLOW
CREAT SERPL-MCNC: 1.4 MG/DL (ref 0.5–1)
CREAT SERPL-MCNC: 1.9 MG/DL (ref 0.5–1)
CREATININE URINE: 51 MG/DL (ref 29–226)
CREATININE URINE: 53 MG/DL (ref 29–226)
EPITHELIAL CELLS, UA: ABNORMAL /HPF
ETHANOL: <10 MG/DL (ref 0–0.08)
FENTANYL SCREEN, URINE: POSITIVE
GFR AFRICAN AMERICAN: 35
GFR AFRICAN AMERICAN: 50
GFR NON-AFRICAN AMERICAN: 29 ML/MIN/1.73
GFR NON-AFRICAN AMERICAN: 41 ML/MIN/1.73
GLUCOSE BLD-MCNC: 169 MG/DL (ref 74–99)
GLUCOSE BLD-MCNC: 97 MG/DL (ref 74–99)
GLUCOSE URINE: NEGATIVE MG/DL
HCG, URINE, POC: NEGATIVE
HCT VFR BLD CALC: 40.5 % (ref 34–48)
HEMOGLOBIN: 14 G/DL (ref 11.5–15.5)
HYALINE CASTS: ABNORMAL /LPF (ref 0–2)
KETONES, URINE: ABNORMAL MG/DL
LACTIC ACID: 1.5 MMOL/L (ref 0.5–2.2)
LEUKOCYTE ESTERASE, URINE: NEGATIVE
LIPASE: 63 U/L (ref 13–60)
LIPASE: 73 U/L (ref 13–60)
LV EF: 55 %
LVEF MODALITY: NORMAL
Lab: ABNORMAL
Lab: NORMAL
MAGNESIUM: 2.3 MG/DL (ref 1.6–2.6)
MCH RBC QN AUTO: 30.4 PG (ref 26–35)
MCHC RBC AUTO-ENTMCNC: 34.6 % (ref 32–34.5)
MCV RBC AUTO: 87.9 FL (ref 80–99.9)
METHADONE SCREEN, URINE: NOT DETECTED
MICROALBUMIN UR-MCNC: 207.4 MG/L
MICROALBUMIN/CREAT UR-RTO: 406.7 (ref 0–30)
NEGATIVE QC PASS/FAIL: NORMAL
NITRITE, URINE: NEGATIVE
OPIATE SCREEN URINE: NOT DETECTED
OXYCODONE URINE: NOT DETECTED
PARATHYROID HORMONE INTACT: 161 PG/ML (ref 15–65)
PDW BLD-RTO: 12.2 FL (ref 11.5–15)
PH UA: 7.5 (ref 5–9)
PHENCYCLIDINE SCREEN URINE: NOT DETECTED
PHOSPHORUS: 3.3 MG/DL (ref 2.5–4.5)
PLATELET # BLD: 304 E9/L (ref 130–450)
PMV BLD AUTO: 10.3 FL (ref 7–12)
POSITIVE QC PASS/FAIL: NORMAL
POTASSIUM SERPL-SCNC: 3.1 MMOL/L (ref 3.5–5)
POTASSIUM SERPL-SCNC: 3.7 MMOL/L (ref 3.5–5)
POTASSIUM, UR: 36.1 MMOL/L
PROTEIN UA: 30 MG/DL
RBC # BLD: 4.61 E12/L (ref 3.5–5.5)
RBC UA: ABNORMAL /HPF (ref 0–2)
SALICYLATE, SERUM: <0.3 MG/DL (ref 0–30)
SODIUM BLD-SCNC: 131 MMOL/L (ref 132–146)
SODIUM BLD-SCNC: 133 MMOL/L (ref 132–146)
SODIUM URINE: 86 MMOL/L
SPECIFIC GRAVITY UA: 1.01 (ref 1–1.03)
TOTAL PROTEIN: 6.3 G/DL (ref 6.4–8.3)
TOTAL PROTEIN: 6.6 G/DL (ref 6.4–8.3)
TRICHOMONAS: PRESENT /HPF
TRICYCLIC ANTIDEPRESSANTS SCREEN SERUM: NEGATIVE NG/ML
UREA NITROGEN, UR: 608 MG/DL (ref 800–1666)
UROBILINOGEN, URINE: 0.2 E.U./DL
VITAMIN D 25-HYDROXY: 28 NG/ML (ref 30–100)
WBC # BLD: 26.5 E9/L (ref 4.5–11.5)
WBC UA: ABNORMAL /HPF (ref 0–5)

## 2022-08-22 PROCEDURE — 84300 ASSAY OF URINE SODIUM: CPT

## 2022-08-22 PROCEDURE — 84133 ASSAY OF URINE POTASSIUM: CPT

## 2022-08-22 PROCEDURE — 82436 ASSAY OF URINE CHLORIDE: CPT

## 2022-08-22 PROCEDURE — 6370000000 HC RX 637 (ALT 250 FOR IP): Performed by: NURSE PRACTITIONER

## 2022-08-22 PROCEDURE — 2580000003 HC RX 258: Performed by: INTERNAL MEDICINE

## 2022-08-22 PROCEDURE — 82330 ASSAY OF CALCIUM: CPT

## 2022-08-22 PROCEDURE — 6360000002 HC RX W HCPCS

## 2022-08-22 PROCEDURE — 85027 COMPLETE CBC AUTOMATED: CPT

## 2022-08-22 PROCEDURE — 86703 HIV-1/HIV-2 1 RESULT ANTBDY: CPT

## 2022-08-22 PROCEDURE — 94640 AIRWAY INHALATION TREATMENT: CPT

## 2022-08-22 PROCEDURE — 93005 ELECTROCARDIOGRAM TRACING: CPT | Performed by: NURSE PRACTITIONER

## 2022-08-22 PROCEDURE — 80053 COMPREHEN METABOLIC PANEL: CPT

## 2022-08-22 PROCEDURE — 81001 URINALYSIS AUTO W/SCOPE: CPT

## 2022-08-22 PROCEDURE — 6370000000 HC RX 637 (ALT 250 FOR IP): Performed by: INTERNAL MEDICINE

## 2022-08-22 PROCEDURE — 82570 ASSAY OF URINE CREATININE: CPT

## 2022-08-22 PROCEDURE — 87081 CULTURE SCREEN ONLY: CPT

## 2022-08-22 PROCEDURE — 71250 CT THORAX DX C-: CPT

## 2022-08-22 PROCEDURE — 2000000000 HC ICU R&B

## 2022-08-22 PROCEDURE — 6360000002 HC RX W HCPCS: Performed by: NURSE PRACTITIONER

## 2022-08-22 PROCEDURE — 83735 ASSAY OF MAGNESIUM: CPT

## 2022-08-22 PROCEDURE — 99254 IP/OBS CNSLTJ NEW/EST MOD 60: CPT | Performed by: INTERNAL MEDICINE

## 2022-08-22 PROCEDURE — 84100 ASSAY OF PHOSPHORUS: CPT

## 2022-08-22 PROCEDURE — APPSS45 APP SPLIT SHARED TIME 31-45 MINUTES: Performed by: NURSE PRACTITIONER

## 2022-08-22 PROCEDURE — 83605 ASSAY OF LACTIC ACID: CPT

## 2022-08-22 PROCEDURE — 82044 UR ALBUMIN SEMIQUANTITATIVE: CPT

## 2022-08-22 PROCEDURE — 84540 ASSAY OF URINE/UREA-N: CPT

## 2022-08-22 PROCEDURE — 6360000002 HC RX W HCPCS: Performed by: INTERNAL MEDICINE

## 2022-08-22 PROCEDURE — 80307 DRUG TEST PRSMV CHEM ANLYZR: CPT

## 2022-08-22 PROCEDURE — 83690 ASSAY OF LIPASE: CPT

## 2022-08-22 PROCEDURE — 2500000003 HC RX 250 WO HCPCS: Performed by: NURSE PRACTITIONER

## 2022-08-22 PROCEDURE — 36592 COLLECT BLOOD FROM PICC: CPT

## 2022-08-22 PROCEDURE — 87449 NOS EACH ORGANISM AG IA: CPT

## 2022-08-22 PROCEDURE — 87206 SMEAR FLUORESCENT/ACID STAI: CPT

## 2022-08-22 PROCEDURE — 2580000003 HC RX 258: Performed by: NURSE PRACTITIONER

## 2022-08-22 PROCEDURE — 6370000000 HC RX 637 (ALT 250 FOR IP): Performed by: STUDENT IN AN ORGANIZED HEALTH CARE EDUCATION/TRAINING PROGRAM

## 2022-08-22 PROCEDURE — 93306 TTE W/DOPPLER COMPLETE: CPT

## 2022-08-22 PROCEDURE — 87070 CULTURE OTHR SPECIMN AEROBIC: CPT

## 2022-08-22 RX ORDER — MECOBALAMIN 5000 MCG
5 TABLET,DISINTEGRATING ORAL
Status: COMPLETED | OUTPATIENT
Start: 2022-08-22 | End: 2022-08-22

## 2022-08-22 RX ORDER — SODIUM CHLORIDE 9 MG/ML
100 INJECTION, SOLUTION INTRAVENOUS CONTINUOUS
Status: DISCONTINUED | OUTPATIENT
Start: 2022-08-22 | End: 2022-08-23

## 2022-08-22 RX ORDER — PROCHLORPERAZINE EDISYLATE 5 MG/ML
10 INJECTION INTRAMUSCULAR; INTRAVENOUS EVERY 6 HOURS PRN
Status: DISCONTINUED | OUTPATIENT
Start: 2022-08-22 | End: 2022-08-22

## 2022-08-22 RX ORDER — IPRATROPIUM BROMIDE AND ALBUTEROL SULFATE 2.5; .5 MG/3ML; MG/3ML
1 SOLUTION RESPIRATORY (INHALATION) 4 TIMES DAILY
Status: DISCONTINUED | OUTPATIENT
Start: 2022-08-22 | End: 2022-08-25 | Stop reason: HOSPADM

## 2022-08-22 RX ORDER — METRONIDAZOLE 500 MG/1
500 TABLET ORAL EVERY 12 HOURS SCHEDULED
Status: DISCONTINUED | OUTPATIENT
Start: 2022-08-22 | End: 2022-08-25 | Stop reason: HOSPADM

## 2022-08-22 RX ORDER — MECOBALAMIN 5000 MCG
5 TABLET,DISINTEGRATING ORAL NIGHTLY
Status: DISCONTINUED | OUTPATIENT
Start: 2022-08-22 | End: 2022-08-25 | Stop reason: HOSPADM

## 2022-08-22 RX ORDER — POTASSIUM CHLORIDE 7.45 MG/ML
10 INJECTION INTRAVENOUS
Status: COMPLETED | OUTPATIENT
Start: 2022-08-22 | End: 2022-08-22

## 2022-08-22 RX ORDER — HEPARIN SODIUM 5000 [USP'U]/ML
5000 INJECTION, SOLUTION INTRAVENOUS; SUBCUTANEOUS EVERY 8 HOURS SCHEDULED
Status: DISCONTINUED | OUTPATIENT
Start: 2022-08-22 | End: 2022-08-25 | Stop reason: HOSPADM

## 2022-08-22 RX ADMIN — SODIUM CHLORIDE 1000 ML: 9 INJECTION, SOLUTION INTRAVENOUS at 02:43

## 2022-08-22 RX ADMIN — HEPARIN SODIUM 5000 UNITS: 5000 INJECTION INTRAVENOUS; SUBCUTANEOUS at 08:43

## 2022-08-22 RX ADMIN — POTASSIUM CHLORIDE 10 MEQ: 7.46 INJECTION, SOLUTION INTRAVENOUS at 08:47

## 2022-08-22 RX ADMIN — Medication 5 MG: at 21:15

## 2022-08-22 RX ADMIN — POTASSIUM CHLORIDE 10 MEQ: 7.46 INJECTION, SOLUTION INTRAVENOUS at 10:09

## 2022-08-22 RX ADMIN — POTASSIUM CHLORIDE 10 MEQ: 7.46 INJECTION, SOLUTION INTRAVENOUS at 11:17

## 2022-08-22 RX ADMIN — METRONIDAZOLE 500 MG: 500 TABLET ORAL at 21:15

## 2022-08-22 RX ADMIN — PIPERACILLIN AND TAZOBACTAM 3375 MG: 3; .375 INJECTION, POWDER, FOR SOLUTION INTRAVENOUS at 07:26

## 2022-08-22 RX ADMIN — METRONIDAZOLE 500 MG: 500 TABLET ORAL at 09:30

## 2022-08-22 RX ADMIN — SULFAMETHOXAZOLE AND TRIMETHOPRIM 121.6 MG: 80; 16 INJECTION, SOLUTION, CONCENTRATE INTRAVENOUS at 20:05

## 2022-08-22 RX ADMIN — HEPARIN SODIUM 5000 UNITS: 5000 INJECTION INTRAVENOUS; SUBCUTANEOUS at 21:15

## 2022-08-22 RX ADMIN — VANCOMYCIN HYDROCHLORIDE 750 MG: 5 INJECTION, POWDER, LYOPHILIZED, FOR SOLUTION INTRAVENOUS at 10:13

## 2022-08-22 RX ADMIN — Medication 5 MG: at 03:17

## 2022-08-22 RX ADMIN — POTASSIUM CHLORIDE 10 MEQ: 7.46 INJECTION, SOLUTION INTRAVENOUS at 09:31

## 2022-08-22 RX ADMIN — SULFAMETHOXAZOLE AND TRIMETHOPRIM 121.6 MG: 80; 16 INJECTION, SOLUTION, CONCENTRATE INTRAVENOUS at 12:35

## 2022-08-22 RX ADMIN — CLONIDINE HYDROCHLORIDE 0.1 MG: 0.1 TABLET ORAL at 05:36

## 2022-08-22 RX ADMIN — IPRATROPIUM BROMIDE AND ALBUTEROL SULFATE 1 AMPULE: .5; 2.5 SOLUTION RESPIRATORY (INHALATION) at 11:48

## 2022-08-22 RX ADMIN — HEPARIN SODIUM 5000 UNITS: 5000 INJECTION INTRAVENOUS; SUBCUTANEOUS at 13:30

## 2022-08-22 RX ADMIN — SULFAMETHOXAZOLE AND TRIMETHOPRIM 147.2 MG: 80; 16 INJECTION, SOLUTION, CONCENTRATE INTRAVENOUS at 03:17

## 2022-08-22 RX ADMIN — SODIUM CHLORIDE 1000 ML: 9 INJECTION, SOLUTION INTRAVENOUS at 09:34

## 2022-08-22 RX ADMIN — IPRATROPIUM BROMIDE AND ALBUTEROL SULFATE 1 AMPULE: .5; 2.5 SOLUTION RESPIRATORY (INHALATION) at 16:18

## 2022-08-22 RX ADMIN — PIPERACILLIN AND TAZOBACTAM 3375 MG: 3; .375 INJECTION, POWDER, FOR SOLUTION INTRAVENOUS at 13:25

## 2022-08-22 NOTE — PROGRESS NOTES
Pulmonary/Critical Care Progress Note    We are following patient for intractable vomiting, RIGO, possible NSTEMI with anteroseptal ischemic changes, polysubstance abuse including snorting of methamphetamine, fentanyl, and heroin, heavy cigarette smoking (1-1/2 packs/day for 30 years), elevated lactate level now normal elevated troponin coming down, elevated QTc interval no normal at approximately 400 µs, history of hepatitis C, hyperlipidemia    SUBJECTIVE:  Patient has made considerable improvement today especially when compared to her initial lab values. We initiated vancomycin and Zosyn in addition to the Bactrim that she was placed on prophylactically because we do not have an HIV study back. I have looked at the CT scanning of her chest which shows multiple areas of destruction of lung tissue secondary to extensive inhalation of toxic substances causing an immune response with release of proteolytic enzymes creating pneumatocele like spaces with destruction of alveolar capillary surface area. Electrolytes are normalizing as are renal indices. Nevertheless, we may not have seen the last of her drug withdrawal and we will continue to monitor closely in the intensive care unit. She is on the COWS assessment protocol. We will need transthoracic echocardiogram to see if there is suspicion for vegetation on her heart valves. Patient is being treated for trichomonas urinary tract infection with metronidazole which is acceptable now that her QTc interval has normalized.     MEDICATIONS:   melatonin  5 mg Oral Nightly    heparin (porcine)  5,000 Units SubCUTAneous 3 times per day    sulfamethoxazole-trimethoprim (BACTRIM) IVPB  2.5 mg/kg IntraVENous Q8H    piperacillin-tazobactam  3,375 mg IntraVENous Q12H    potassium chloride  10 mEq IntraVENous Q1H    metroNIDAZOLE  500 mg Oral 2 times per day    vancomycin (VANCOCIN) intermittent dosing (placeholder)   Other RX Placeholder    nicotine  1 patch TransDERmal Daily      sodium chloride 1,000 mL (08/22/22 0934)     perflutren lipid microspheres, cloNIDine      REVIEW OF SYSTEMS:  Constitutional: Denies fever, weight loss, night sweats, and fatigue  Skin: Denies pigmentation, dark lesions, and rashes   HEENT: Denies hearing loss, tinnitus, ear drainage, epistaxis, sore throat, and hoarseness. Cardiovascular: Denies palpitations, chest pain, and chest pressure. Respiratory: Denies cough, dyspnea at rest, hemoptysis, apnea, and choking. Gastrointestinal: Denies nausea, vomiting, poor appetite, diarrhea, heartburn or reflux  Genitourinary: Denies dysuria, frequency, urgency or hematuria  Musculoskeletal: Denies myalgias, muscle weakness, and bone pain  Neurological: Denies dizziness, vertigo, headache, and focal weakness  Psychological: Denies anxiety and depression  Endocrine: Denies heat intolerance and cold intolerance  Hematopoietic/Lymphatic: Denies bleeding problems and blood transfusions    OBJECTIVE:  Vitals:    08/22/22 0600   BP: (!) 135/98   Pulse: 83   Resp: 29   Temp:    SpO2:            O2 Device: None (Room air)    PHYSICAL EXAM:  Constitutional: No fever, chills, diaphoresis  Skin: No skin rash, no skin breakdown  HEENT: Unremarkable  Neck: No JVD, lymphadenopathy, thyromegaly  Cardiovascular: S1, S2 regular. No S3 or rubs noted  Respiratory: Few soft expiratory wheeze bilaterally  Gastrointestinal: Soft, flat, nontender  Genitourinary: No CVA tenderness  Extremities: No clubbing, cyanosis, or edema  Neurological: Awake, alert, oriented x3.   No evidence of focal motor or sensory deficits at this time  Psychological: Mildly depressed affect    LABS:  WBC   Date Value Ref Range Status   08/22/2022 26.5 (H) 4.5 - 11.5 E9/L Final   08/21/2022 22.9 (H) 4.5 - 11.5 E9/L Final   05/23/2014 11.8 (H) 4.5 - 11.5 E9/L Final     Hemoglobin   Date Value Ref Range Status   08/22/2022 14.0 11.5 - 15.5 g/dL Final   08/21/2022 20.6 (H) 11.5 - 15.5 g/dL Final 05/23/2014 13.8 11.5 - 15.5 g/dL Final     Hematocrit   Date Value Ref Range Status   08/22/2022 40.5 34.0 - 48.0 % Final   08/21/2022 60.8 (H) 34.0 - 48.0 % Final   05/23/2014 40.9 34.0 - 48.0 % Final     MCV   Date Value Ref Range Status   08/22/2022 87.9 80.0 - 99.9 fL Final   08/21/2022 86.6 80.0 - 99.9 fL Final   05/23/2014 92.3 80.0 - 99.9 fL Final     Platelets   Date Value Ref Range Status   08/22/2022 304 130 - 450 E9/L Final   08/21/2022 554 (H) 130 - 450 E9/L Final   05/23/2014 216 130 - 450 E9/L Final     Sodium   Date Value Ref Range Status   08/22/2022 133 132 - 146 mmol/L Final   08/21/2022 141 132 - 146 mmol/L Final   05/23/2014 141 132 - 146 mmol/L Final     Potassium   Date Value Ref Range Status   08/22/2022 3.1 (L) 3.5 - 5.0 mmol/L Final   08/21/2022 4.4 3.5 - 5.0 mmol/L Final   05/23/2014 3.7 3.5 - 5.0 mmol/L Final     Chloride   Date Value Ref Range Status   08/22/2022 92 (L) 98 - 107 mmol/L Final   08/21/2022 69 (LL) 98 - 107 mmol/L Final   05/23/2014 104 98 - 107 mmol/L Final     CO2   Date Value Ref Range Status   08/22/2022 30 (H) 22 - 29 mmol/L Final   08/21/2022 38 (H) 22 - 29 mmol/L Final   05/23/2014 28 22 - 29 mmol/L Final     BUN   Date Value Ref Range Status   08/22/2022 52 (H) 6 - 20 mg/dL Final   08/21/2022 73 (H) 6 - 20 mg/dL Final   05/23/2014 11 6 - 20 mg/dL Final     Creatinine   Date Value Ref Range Status   08/22/2022 1.9 (H) 0.5 - 1.0 mg/dL Final   08/21/2022 4.0 (H) 0.5 - 1.0 mg/dL Final   05/23/2014 0.6 0.5 - 1.0 mg/dL Final     Glucose   Date Value Ref Range Status   08/22/2022 97 74 - 99 mg/dL Final   08/21/2022 174 (H) 74 - 99 mg/dL Final   05/23/2014 101 74 - 109 mg/dL Final     Calcium   Date Value Ref Range Status   08/22/2022 8.4 (L) 8.6 - 10.2 mg/dL Final   08/21/2022 11.4 (H) 8.6 - 10.2 mg/dL Final   05/23/2014 9.7 8.6 - 10.2 mg/dL Final     Total Protein   Date Value Ref Range Status   08/22/2022 6.6 6.4 - 8.3 g/dL Final   08/21/2022 11.4 (H) 6.4 - 8.3 g/dL HCO3, BE, O2SAT in the last 72 hours. RADIOLOGY:  CT CHEST WO CONTRAST   Final Result   Areas of likely bullous formation thin walled cyst greatest in the left upper   lobe anteriorly versus paraseptal emphysema. At least mild central   bronchiectasis. No dominant nodule or mass. No acute process of   consolidation or pleural effusion         CT ABDOMEN PELVIS WO CONTRAST Additional Contrast? None   Final Result   1. No CT evidence of acute pancreatitis on noncontrast evaluation. 2. Multifocal bilateral lower lung prominent pneumatoceles, as discussed   above. 3. Small hiatal hernia. XR CHEST PORTABLE   Final Result   No acute cardiopulmonary process. CT HEAD W CONTRAST    (Results Pending)           PROBLEM LIST:  Principal Problem:    Opiate withdrawal ([address]  Resolved Problems:    * No resolved hospital problems.  *      IMPRESSION:  Polysubstance abuse  Severe dehydration with RIGO  Positive for hepatitis C  Erythrocytosis likely secondary to high levels chronically of carbon oxide given her smoking and drug inhalation as well as dehydration  Possible pancreatitis  Leukocytosis  Rule out drug use-related endocarditis  Elevated troponin possibly secondary to cocaine  Multiple cystic areas of the lung compatible with pneumatoceles from longstanding snorting of toxic substances      PLAN:  Hydration-nephrology is supervising  IV antibiotics including Zosyn, vancomycin, and Bactrim at least until HIV study comes back  Monitor renal indices  COWS assessment protocol  Trichomonas treatment with metronidazole  Keep in ICU  Continue DVT prophylaxis with heparin  Advance diet as tolerated    ATTESTATION:  ICU Staff Physician note of personal involvement in Care  As the attending physician, I certify that I personally reviewed the patients history and personally examined the patient to confirm the physical findings described above,  And that I reviewed the relevant imaging studies and available reports. I also discussed the differential diagnosis and all of the proposed management plans with the patient and individuals accompanying the patient to this visit. They had the opportunity to ask questions about the proposed management plans and to have those questions answered. This patient has a high probability of sudden, clinically significant deterioration, which requires the highest level of physician preparedness to intervene urgently. I managed/supervised life or organ supporting interventions that required frequent physician assessment. I devoted my full attention to the direct care of this patient for the amount of time indicated below. Time I spent with the family or surrogate(s) is included only if the patient was incapable of providing the necessary information or participating in medical decisions - Time devoted to teaching and to any procedures I billed separately is not included.     CRITICAL CARE TIME:  33 minutes    Electronically signed by Berny Mendosa MD on 8/22/2022 at 11:14 AM

## 2022-08-22 NOTE — CONSULTS
Inpatient Cardiology Consultation      Reason for Consult:  NSTEMI    Consulting Physician: Dr. Janny Nugent    Requesting Physician:  Dr. Betty Kaufman    Date of Consultation: 8/22/2022    HISTORY OF PRESENT ILLNESS:     This 51-year-old female is new to [address] [place] [address] and denies any cardiac history. She presented to the emergency room yesterday evening from a [facility] where she is incarcerated. She has been withdrawing from opiates over the last few days. She tells me she has been snorting cocaine daily for years. She was nauseated and vomiting. She has not been eating or drinking. She had no chest pain or dyspnea. She did have vomiting with hematemesis but no diarrhea. She had no cough or fever. She had an abdominal discomfort which was an ache and was intermittent for a few days but once again denies chest pain. EKG on admission was sinus tachycardia with a short [place] interval and marked ST abnormality anteriorly with a QTC of 6 7 2 ms. There was no prior EKG to compare. Blood pressure on admission was 148/86 and heart rate 109-116 bpm and she was afebrile with no hypoxia on room air. Chest x-ray showed no acute process and CT of the abdomen and pelvis without contrast showed multifocal lower lung pneumatoceles but no pleural effusion. Potassium was 4.4 with a BUN of 73 and a creatinine of 4, anion gap 34, magnesium 3.3, lactic acid 7.5 and calcium 11.4 with a phosphorus of 10 and total protein 11.4, troponin 135-60, ethanol level less than 10 and drug screen positive for fentanyl at 1:30 AM this morning, WBCs 22.9 and H&H 20.6 and 60.8  , Platelets 791, urinalysis with moderate amount of blood, trichomonas, pregnancy negative. Lipase was elevated at 96 but there was no evidence of pancreatitis on CT of the abdomen. She was treated with Zofran and given Protonix and Tigan. Pneumonia with suspected and she was aggressively hydrated. She is being followed by nephrology.     This morning potassium is down to 3.1 and BUN and creatinine have improved to 52 and 1.9, calcium is now 8.4 and lipase is 63, WBCs up to 26.5 and H&H now 14 and 40.5    Past medical history  Father with premature coronary artery disease  Substance abuse with fentanyl, \"ice \", tobacco abuse with a 7-pack-year history, history of IV drug abuse? Denies alcohol  Hyperlipidemia  Varicose veins  Tubal ligation  Hiatal hernia  Hepatitis C          Medications Prior to admit:  Prior to Admission medications    Medication Sig Start Date End Date Taking? Authorizing Provider   meloxicam (MOBIC) 7.5 MG tablet Take 7.5 mg by mouth daily   Yes Historical Provider, MD   methocarbamol (ROBAXIN) 500 MG tablet Take 500 mg by mouth 2 times daily   Yes Historical Provider, MD       Current Medications:    Current Facility-Administered Medications: melatonin disintegrating tablet 5 mg, 5 mg, Oral, Nightly  heparin (porcine) injection 5,000 Units, 5,000 Units, SubCUTAneous, 3 times per day  sulfamethoxazole-trimethoprim (BACTRIM) 121.6 mg in dextrose 5 % 500 mL IVPB, 2.5 mg/kg, IntraVENous, Q8H  piperacillin-tazobactam (ZOSYN) 3,375 mg in sodium chloride 0.9 % 50 mL IVPB (mini-bag), 3,375 mg, IntraVENous, Once **FOLLOWED BY** piperacillin-tazobactam (ZOSYN) 3,375 mg in sodium chloride 0.9 % 50 mL IVPB (mini-bag), 3,375 mg, IntraVENous, Q12H  potassium chloride 10 mEq/100 mL IVPB (Peripheral Line), 10 mEq, IntraVENous, Q1H  cloNIDine (CATAPRES) tablet 0.1 mg, 0.1 mg, Oral, Q6H PRN  nicotine (NICODERM CQ) 14 MG/24HR 1 patch, 1 patch, TransDERmal, Daily  0.9 % sodium chloride infusion, 1,000 mL, IntraVENous, Continuous    Allergies:  Patient has no known allergies.     Social History: Current tobacco use with a history of IV drug abuse and currently snorting fentanyl and using \"ice \"but denies alcohol        Family History:   Family History   Problem Relation Age of Onset    Heart Disease Father          of heart attack at age 28    Diabetes Paternal Grandmother     Diabetes Other         Aunt    Diabetes Other         Uncle    COPD Paternal Grandfather        REVIEW OF SYSTEMS:     Constitutional: Denies fatigue, fevers, chills or night sweats  Eyes: Denies visual changes or drainage  ENT: Denies headaches or hearing loss. No mouth sores or sore throat. No epistaxis   Cardiovascular: Denies chest pain, pressure or palpitations. No lower extremity swelling. Respiratory: Denies ORTEZ, cough, orthopnea or PND. No hemoptysis   Gastrointestinal: Denies diarrhea  Genitourinary: Denies urgency, dysuria or hematuria. Musculoskeletal: Denies gait disturbance, weakness or joint complaints  Integumentary: Denies rash, hives or pruritis   Neurological: Denies dizziness, headaches or seizures. No numbness or tingling  Psychiatric: Denies anxiety or depression. Endocrine: Denies temperature intolerance. No recent weight change. .  Hematologic/Lymphatic: Denies abnormal bruising or bleeding. No swollen lymph nodes    PHYSICAL EXAM:   BP (!) 135/98   Pulse 83   Temp 98.9 °F (37.2 °C) (Oral)   Resp 29   Ht 5' (1.524 m)   Wt 106 lb 11.2 oz (48.4 kg)   SpO2 97%   BMI 20.84 kg/m²   CONST:  Well developed, well nourished who appears of stated age. Awake, drowsy but awakens easily and cooperative. No apparent distress. HEENT:   Head- Normocephalic, atraumatic   Eyes- Conjunctivae pink, anicteric  Throat- Oral mucosa pink and moist  Neck-  No stridor, trachea midline, no jugular venous distention. No carotid bruit. CHEST: Chest symmetrical and non-tender to palpation. No accessory muscle use or intercostal retractions  RESPIRATORY: Lung sounds - clear throughout fields with a slight inspiratory wheeze  CARDIOVASCULAR:     Heart Inspection- shows no noted pulsations  Heart Palpation- no heaves or thrills; PMI is non-displaced   Heart Ausculation- Regular rate and rhythm, no murmur. No s3, s4 or rub   PV: No lower extremity edema. No varicosities.  Pedal pulses palpable, no clubbing or cyanosis , bilateral lower extremities are mottled  ABDOMEN: Soft, non-tender to light palpation. Bowel sounds present. No palpable masses no organomegaly; no abdominal bruit  MS: Good muscle strength and tone. No atrophy or abnormal movements. : Deferred  SKIN: Warm and dry no statis dermatitis or ulcers   NEURO / PSYCH: Oriented to person, place and time. Speech clear and appropriate. Follows all commands. Pleasant affect     DATA:    ECG / Tele strips: Sinus rhythm  Diagnostic:      Intake/Output Summary (Last 24 hours) at 8/22/2022 0732  Last data filed at 8/22/2022 0600  Gross per 24 hour   Intake 1229.88 ml   Output --   Net 1229.88 ml       Labs:   CBC:   Recent Labs     08/21/22 1938 08/22/22  0608   WBC 22.9* 26.5*   HGB 20.6* 14.0   HCT 60.8* 40.5   * 304     BMP:   Recent Labs     08/21/22 1938 08/22/22  0608    133   K 4.4 3.1*   CO2 38* 30*   BUN 73* 52*   CREATININE 4.0* 1.9*   LABGLOM 12 29   CALCIUM 11.4* 8.4*     Mag:   Recent Labs     08/21/22 1938 08/22/22  0608   MG 3.3* 2.3     Phos:   Recent Labs     08/21/22 1938 08/22/22  0608   PHOS 10.1* 3.3     TFT:   Lab Results   Component Value Date    TSH 2.884 09/06/2013      HgA1c: No results found for: LABA1C  No results found for: EAG  proBNP: No results for input(s): PROBNP in the last 72 hours. PT/INR: No results for input(s): PROTIME, INR in the last 72 hours. APTT:No results for input(s): APTT in the last 72 hours.   CARDIAC ENZYMES:  Recent Labs     08/21/22 1938 08/21/22  2211   CKTOTAL 64  --    TROPHS 135* 60*     FASTING LIPID PANEL:  Lab Results   Component Value Date/Time    CHOL 218 03/24/2014 12:25 PM    HDL 46 03/24/2014 12:25 PM    LDLCALC 155 03/24/2014 12:25 PM    TRIG 83 03/24/2014 12:25 PM     LIVER PROFILE:  Recent Labs     08/21/22  1938 08/22/22  0608   AST 19 25   ALT 19 12   LABALBU 6.7* 3.3*     Pressures discussed with Dr. Job Vázquez  Elevated troponin and EKG changes with most likely a non-ST elevation MI type II due to demand ischemia in the setting of acute renal failure and electrolyte imbalance , lactic acidosis and withdrawal from fentanyl  Prolonged QTC  Substance abuse with fentanyl and tobacco and \"ice \"  Possible pneumonia  Acute renal failure is resolving  Elevated lipase with no evidence of pancreatitis on CAT scan of the abdomen  History of hepatitis C  Hypokalemia  Father with premature CAD    Plans  Supplement potassium  2D echo  Repeat EKG  Avoid QTC prolonging drugs  Eventual ischemic evaluation when over the acute illness      Electronically signed by EDITA Cuba CNP on 8/22/2022 at 7:32 [address] [facility] [place]    I have personally participated in a face-to-face and personally obtained history and performed physical exam on the date of service. I reviewed chart, vitals, labs and radiologic studies. I also participated in medical decision making with EDITA Cuba CNP on the date of service All of the assessments and recommendations are from me and I agree with all of the pertinent clinical information, assessment and treatment plan. I have reviewed and edited the note above based on my findings during my history, exam, and decision making. Please see my additional contributions to the history, physical exam, assessment, and recommendations below. HISTORY OF PRESENT ILLNESS:     Reviewed, as above      Past medical history:  Reviewed, as above. Past surgical history:  Reviewed, as above. Current medications:  Reviewed, as above    Allergies:  Reviewed, as above    Social history:  Reviewed, as above    Family medical history:  Reviewed, as above. REVIEW OF SYSTEMS:   Reviewed, as above.     PHYSICAL EXAM:   CONSTITUTIONAL: Sleepy, no apparent distress, and appears stated age  EYES:  lids and lashes normal and pupils equal, round and reactive to light, anicteric sclerae  HEAD:  normocepalic, without obvious abnormality, atraumatic, pink, moist mucous membranes. NECK:  Supple, symmetrical, trachea midline, no adenopathy, thyroid symmetric, not enlarged and no tenderness, skin normal  HEMATOLOGIC/LYMPHATICS:  no cervical lymphadenopathy and no supraclavicular lymphadenopathy  LUNGS:  No increased work of breathing, good air exchange, clear to auscultation bilaterally, no crackles or wheezing  CARDIOVASCULAR:  Normal apical impulse, regular rate and rhythm, normal S1 and S2, no S3 or S4, and no murmur noted and no JVD, no carotid bruit, no pedal edema, good carotid upstroke bilaterally. ABDOMEN:  Soft, nontender, no masses, no hepatomegaly or splenomegaly, BS+  CHEST: nontender to palpation, expands symmetrically  MUSCULOSKELETAL:  No clubbing no cyanosis. there is no redness, warmth, or swelling of the joints  full range of motion noted  NEUROLOGIC:  Alert, awake. SKIN:  no bruising or bleeding, normal skin color, texture, turgor and no redness, warmth, or swelling    BP (!) 149/90   Pulse 61   Temp 98.3 °F (36.8 °C) (Oral)   Resp 29   Ht 5' (1.524 m)   Wt 106 lb 11.2 oz (48.4 kg)   SpO2 99%   BMI 20.84 kg/m²       I/O last 3 completed shifts: In: 3692.8 [P.O.:170; I.V.:1995.1; IV Piggyback:1527.7]  Out: -   No intake/output data recorded. DATA:   I personally reviewed the admission EKG with the following interpretation: Sinus tachycardia, ST-T changes in the inferior and anterolateral leads    ECHO: 8/22/2022, Summary   No previous echo for comparison. Technically adequate study. Left ventricle size is normal.   Normal left ventricular wall thickness. Ejection fraction is visually estimated at 55%. No regional wall motion abnormalities seen. E/A flow reversal noted. Suggestive of diastolic dysfunction. Physiologic and/or trace tricuspid regurgitation.    RVSP is normal.  Stress Test: Not performed to date  Angiography: Not performed to date  Cardiology Labs: BMP:    Lab Results   Component Value HEAD W CONTRAST    (Results Pending)       I have personally reviewed the laboratory, cardiac diagnostic and radiographic testing as outlined above:    IMPRESSION:  Elevated troponin: Secondary to comorbid conditions  Abnormal EKG: ST-T changes suggestive of ischemia, will need ischemic evaluation prior to discharge  Prolonged QT interval: Secondary to electrolyte imbalance, comorbid conditions, ischemic?,  As above  Tobacco abuse  Polysubstance abuse  History of hep C  Family history of early CAD  8. Fentanyl withdrawal  9. Acute kidney injury    RECOMMENDATIONS:   Continue current supportive care  Will avoid QT prolonging medications  Will maintain normal potassium and normal magnesium  Ischemic evaluation prior to discharge  Further cardiac recommendations will be forthcoming pending her clinical course and diagnostic test findings    No family at bedside    Thank you for the consult  Electronically signed by Dola Siemens, MD on 8/22/2022 at 7:13 PM    All of the above was discussed with the patient  reviewing the above stated recommendations. And a total of >50% of that time involved face-to-face time providing counseling and or coordination of care with the other providers, reviewing records/tests, counseling/education of the patient, ordering medications/tests/procedures, coordinating care, and documenting clinical information in the EHR. I also discussed the differential diagnosis and all of the proposed management plans with the patient and individuals accompanying the patient. NOTE: This report was transcribed using voice recognition software.  Every effort was made to ensure accuracy; however, inadvertent computerized transcription errors may be present

## 2022-08-22 NOTE — ED NOTES
ANTICOAGULATION FOLLOW-UP CLINIC VISIT    Patient Name:  Daniela Ladd  Date:  3/3/2021  Contact Type:  no call needed patient to continue same dose    SUBJECTIVE:  Patient Findings         Clinical Outcomes     Negatives:  Major bleeding event, Thromboembolic event, Anticoagulation-related hospital admission, Anticoagulation-related ED visit, Anticoagulation-related fatality           OBJECTIVE    Recent labs: (last 7 days)     21   INR 2.6*       ASSESSMENT / PLAN  INR assessment THER    Recheck INR In: 1 WEEK    INR Location Home INR      Anticoagulation Summary  As of 3/3/2021    INR goal:  2.5-3.5   TTR:  86.0 % (1 y)   INR used for dosin.6 (3/3/2021)   Warfarin maintenance plan:  10 mg (2 mg x 5) every Mon, Wed, Fri; 8 mg (2 mg x 4) all other days   Full warfarin instructions:  10 mg every Mon, Wed, Fri; 8 mg all other days   Weekly warfarin total:  62 mg   No change documented:  Yoly Crowe RN   Plan last modified:  Yoly Crowe RN (2021)   Next INR check:  3/10/2021   Priority:  High   Target end date:  Indefinite    Indications    Blood clot of vein in shoulder area  left [I82.602]  Factor 5 Leiden mutation  heterozygous (H) [D68.51]  Long-term (current) use of anticoagulants [Z79.01] [Z79.01]             Anticoagulation Episode Summary     INR check location:      Preferred lab:      Send INR reminders to:  [facility]    Comments:  Has home INR machine weekly INR needed         Anticoagulation Care Providers     Provider Role Specialty Phone number    Ling Trammell PA-C Referring Family Medicine [phone]            See the Encounter Report to view Anticoagulation Flowsheet and Dosing Calendar (Go to Encounters tab in chart review, and find the Anticoagulation Therapy Visit)        Yoly Crowe RN                  Dr. Maeve Ybarra aware of critical lactic acid     Elvin Levy  08/21/22 2011

## 2022-08-22 NOTE — CONSULTS
Patient seen and examined. Consult dictated. Patient is a 70-year-old lady admitted with nausea vomiting and acute kidney injury. Acute kidney injury most likely secondary volume depletion in the setting of Srivastava 2 agent. We will scale back IV fluids. Although the patient has history of illicit drug use CPK levels are not high enough to suggest rhabdomyolysis. Patient with good urinary output. BUN and creatinine trending downwards. We will scale back IV fluids. Dr. Awa Kaufman, Thank You for allowing me to participate in the care of this patient. Will follow the patient with you.     Alejo Hsieh MD  Nephrology    Electronically signed by Rickey Choudhary MD on 8/22/2022 at 12:52 PM

## 2022-08-22 NOTE — CARE COORDINATION
8/22/2022 CM transition of care: ICU, Opioid withdrawal- fentanyl and crystal meth. BUN/Cr 73/4.0. Pt is a police hold from [facility]. Officer at pts bedside. Plans to return to [place] at discharge. CM following.  Electronically signed by Demar Onofre RN-BC on 8/22/2022 at 8:36

## 2022-08-22 NOTE — CONSULTS
Pulmonary/Critical Care Consult Note    CHIEF COMPLAINT: Intractable vomiting, opiate withdrawal, RIGO, suspect PJP pneumonia, hypochloremia, elevated lactic acid, hyperglycemia, hypercalcemia, hypophosphatemia, NSTEMI, elevated lipase, polysubstance abuse, hepatitis C, leukocytosis, polycythemia, trichomonas    HISTORY OF PRESENT ILLNESS: Patient is a 42-year-old with history of polysubstance abuse, hepatitis C, and hyperlipidemia. Patient presented to the ED from the [facility] with complaints of intractable vomiting x4 days and possible opiate withdrawal.  Patient denies fever, chills, congestion, sore throat, chest pain, palpitations, shortness of breath, cough, diarrhea, constipation, or urinary symptoms. Patient states she uses fentanyl and \"ice\". Patient has a history of IV drug use but states she currently \"snorts\" the fentanyl and ice. Patient also has a history of hepatitis C. Initial labs obtained in ED showed a chloride of 69, CO2 38, BUN 73, creatinine 4.0, anion gap 34, magnesium 3.3, lactic acid 7.5, glucose 174, calcium 11.4, phosphorus 10.1, total protein 11.4, albumin 6.5, WBC 22.7, hemoglobin 20.6, hematocrit 60.8, platelets 396. Her initial troponin was also elevated at 185 trending down to 96. EKG showed sinus tachycardia with rate of 106 and diffuse ST depression. QT/QTc prolonged at 506/672. Portable chest x-ray showed no acute cardiopulmonary process. CT abdomen and pelvis without contrast showed no evidence of acute pancreatitis. Multifocal bilateral lower lung prominent pneumatoceles and small hiatal hernia noted. ALLERGY:  Patient has no known allergies.     FAMILY HISTORY:  Family History   Problem Relation Age of Onset    Heart Disease Father          of heart attack at age 28    Diabetes Paternal Grandmother     Diabetes Other         Aunt    Diabetes Other         Uncle    COPD Paternal Grandfather        SOCIAL HISTORY:  Social History     Socioeconomic History Marital status: Single     Spouse name: Not on file    Number of children: Not on file    Years of education: Not on file    Highest education level: Not on file   Occupational History    Not on file   Tobacco Use    Smoking status: Every Day     Packs/day: 0.50     Years: 14.00     Pack years: 7.00     Types: Cigarettes    Smokeless tobacco: Current   Substance and Sexual Activity    Alcohol use: No    Drug use: Yes    Sexual activity: Yes     Partners: Male   Other Topics Concern    Not on file   Social History Narrative    Not on file     Social Determinants of Health     Financial Resource Strain: Not on file   Food Insecurity: Not on file   Transportation Needs: Not on file   Physical Activity: Not on file   Stress: Not on file   Social Connections: Not on file   Intimate Partner Violence: Not on file   Housing Stability: Not on file       MEDICAL HISTORY:  Past Medical History:   Diagnosis Date    Hyperlipemia 3/24/2014    Substance abuse     Varicose vein        MEDICATIONS:   nicotine  1 patch TransDERmal Daily      sodium chloride 1,000 mL (08/21/22 8769)     cloNIDine, hydrOXYzine pamoate, traMADol    REVIEW OF SYSTEMS:  Constitutional: Denies fever, weight loss, night sweats, and fatigue  Skin: Denies pigmentation, dark lesions, and rashes   HEENT: Denies hearing loss, tinnitus, ear drainage, epistaxis, sore throat, and hoarseness. Cardiovascular: Denies palpitations, chest pain, and chest pressure. Respiratory: Denies cough, dyspnea at rest, hemoptysis, apnea, and choking.   Gastrointestinal: Reports nausea and vomiting, poor appetite, denies diarrhea, heartburn or reflux  Genitourinary: Denies dysuria, frequency, urgency or hematuria  Musculoskeletal: Denies myalgias, muscle weakness, and bone pain  Neurological: Denies dizziness, vertigo, headache, and focal weakness  Psychological: Denies anxiety and depression  Endocrine: Denies heat intolerance and cold intolerance  Hematopoietic/Lymphatic: Denies - 5.0 mmol/L Final   05/23/2014 3.7 3.5 - 5.0 mmol/L Final   03/24/2014 3.6 3.5 - 5.0 mmol/L Final     Chloride   Date Value Ref Range Status   08/21/2022 69 (LL) 98 - 107 mmol/L Final   05/23/2014 104 98 - 107 mmol/L Final   03/24/2014 104 98 - 107 mmol/L Final     CO2   Date Value Ref Range Status   08/21/2022 38 (H) 22 - 29 mmol/L Final   05/23/2014 28 22 - 29 mmol/L Final   03/24/2014 24 22 - 29 mmol/L Final     BUN   Date Value Ref Range Status   08/21/2022 73 (H) 6 - 20 mg/dL Final   05/23/2014 11 6 - 20 mg/dL Final   03/24/2014 11 6 - 20 mg/dL Final     Creatinine   Date Value Ref Range Status   08/21/2022 4.0 (H) 0.5 - 1.0 mg/dL Final   05/23/2014 0.6 0.5 - 1.0 mg/dL Final   03/24/2014 0.6 0.5 - 1.0 mg/dL Final     Glucose   Date Value Ref Range Status   08/21/2022 174 (H) 74 - 99 mg/dL Final   05/23/2014 101 74 - 109 mg/dL Final   03/24/2014 94 74 - 109 mg/dL Final     Calcium   Date Value Ref Range Status   08/21/2022 11.4 (H) 8.6 - 10.2 mg/dL Final   05/23/2014 9.7 8.6 - 10.2 mg/dL Final   03/24/2014 9.2 8.6 - 10.2 mg/dL Final     Total Protein   Date Value Ref Range Status   08/21/2022 11.4 (H) 6.4 - 8.3 g/dL Final   05/23/2014 7.4 6.4 - 8.3 g/dL Final   03/24/2014 7.8 6.4 - 8.3 g/dL Final     Albumin   Date Value Ref Range Status   08/21/2022 6.7 (H) 3.5 - 5.2 g/dL Final   05/23/2014 4.4 3.5 - 5.2 g/dL Final   03/24/2014 4.5 3.5 - 5.2 g/dL Final     Total Bilirubin   Date Value Ref Range Status   08/21/2022 0.5 0.0 - 1.2 mg/dL Final   05/23/2014 0.2 0.0 - 1.2 mg/dL Final   03/24/2014 0.3 0.0 - 1.2 mg/dL Final     Alkaline Phosphatase   Date Value Ref Range Status   08/21/2022 109 (H) 35 - 104 U/L Final   05/23/2014 64 35 - 104 U/L Final   03/24/2014 60 35 - 104 U/L Final     AST   Date Value Ref Range Status   08/21/2022 19 0 - 31 U/L Final   05/23/2014 16 0 - 31 U/L Final   03/24/2014 17 0 - 31 U/L Final     ALT   Date Value Ref Range Status   08/21/2022 19 0 - 32 U/L Final   05/23/2014 14 0 - 32 U/L Final   03/24/2014 15 0 - 32 U/L Final     GFR Non-   Date Value Ref Range Status   08/21/2022 12 >=60 mL/min/1.73 Final     Comment:     Chronic Kidney Disease: less than 60 ml/min/1.73 sq.m. Kidney Failure: less than 15 ml/min/1.73 sq.m. Results valid for patients 18 years and older. 05/23/2014 >60 >=60 mL/min/1.73 Final     Comment:     Chronic Kidney Disease: less than 60 ml/min/1.73 sq.m. Kidney Failure: less than 15 ml/min/1.73 sq.m. Results valid for patients 18 years and older. 03/24/2014 >60 >=60 mL/min/1.73 Final     Comment:     Chronic Kidney Disease: less than 60 ml/min/1.73 sq.m. Kidney Failure: less than 15 ml/min/1.73 sq.m. Results valid for patients 18 years and older. GFR    Date Value Ref Range Status   08/21/2022 15  Final   05/23/2014 >60  Final   03/24/2014 >60  Final     Magnesium   Date Value Ref Range Status   08/21/2022 3.3 (H) 1.6 - 2.6 mg/dL Final     Phosphorus   Date Value Ref Range Status   08/21/2022 10.1 (HH) 2.5 - 4.5 mg/dL Final     No results for input(s): PH, PO2, PCO2, HCO3, BE, O2SAT in the last 72 hours. RADIOLOGY:  XR CHEST PORTABLE   Final Result   No acute cardiopulmonary process.          CT ABDOMEN PELVIS WO CONTRAST Additional Contrast? None    (Results Pending)       IMPRESSION:  Acute opiate withdrawal  Intractable vomiting  RIGO secondary to dehydration  Suspected PJP pneumonia-multiple bilateral lower lung prominent pneumatoceles  Elevated lipase with no radiographic evidence of pancreatitis  Trichomonas  Hyperglycemia  Leukocytosis  Polycythemia  Hypochloremia  Elevated lactic acid-resolved   hypercalcemia  Hyperphosphatemia  Polysubstance abuse  History of IV drug use  Hepatitis C    PLAN:  Continue aggressive IV hydration  Nephrology following  Monitor renal function and avoid nephrotoxic medications  Strict intake and output  Daily weights  Clonidine as needed for withdrawal symptoms  1,3 beta D glucan and sputum culture  HIV and CD4  IV Bactrim with renal dosing  Blood and urine cultures pending  Continuous pulse oximetry  Cardiac telemetry  Heparin 5000 units subcu every 8 hours for DVT prophylaxis        ATTESTATION:  ICU Staff Physician note of personal involvement in Care  As the attending physician, I certify that I personally reviewed the patients history and personally examined the patient to confirm the physical findings described above,  And that I reviewed the relevant imaging studies and available reports. I also discussed the differential diagnosis and all of the proposed management plans with the patient and individuals accompanying the patient to this visit. They had the opportunity to ask questions about the proposed management plans and to have those questions answered. This patient has a high probability of sudden, clinically significant deterioration, which requires the highest level of physician preparedness to intervene urgently. I managed/supervised life or organ supporting interventions that required frequent physician assessment. I devoted my full attention to the direct care of this patient for the amount of time indicated below. Time I spent with the family or surrogate(s) is included only if the patient was incapable of providing the necessary information or participating in medical decisions - Time devoted to teaching and to any procedures I billed separately is not included.     CRITICAL CARE TIME:  33 minutes    Electronically signed by EDITA Evans CNP on 8/21/2022 at 11:47 PM

## 2022-08-22 NOTE — ED NOTES
Patient requested urinary catheter to be removed. Patient was having \"spasms\" and urinating around catheter. Catheter was removed intact. Patient has been continent of urine and a sample of urine was obtained by voiding into a bedpan. Patient refused to have a new catheter inserted for fluid output tracking.       Nicolas Ramirez RN  08/22/22 5729

## 2022-08-22 NOTE — CONSULTS
[address]                                  CONSULTATION    PATIENT NAME: Donis Ramsey                   :        1980  MED REC NO:   [id number]                            ROOM:       04  ACCOUNT NO:   [de-identified]                           ADMIT DATE: 2022  PROVIDER:     Dean Osei MD    CONSULT DATE:  2022    ADMITTING PROVIDER:  Jaquan Lewis DO    REASON FOR CONSULTATION:  Acute kidney injury. HISTORY OF PRESENT ILLNESS:  The patient is being seen in consultation  at the request of Dr. Jimbo Sumner. The patient is a 70-year-old lady who was  sent into the emergency room at [address] [place] [address] from  the [facility] because of intractable nausea, vomiting and  some abdominal pain. She denies any use of over-the-counter  non-steroidal anti-inflammatory agents, but was taking meloxicam as an  outpatient. She is not sure how and when she was prescribed meloxicam.   Upon presentation, the patient was found to have BUN of 73 mg/dL and  creatinine of 4.0 mg/dL. She was aggressively hydrated. BUN and  creatinine have improved to 52 mg/dL and 1.9 mg/dL respectively. She  has had good urinary output. She does state that she has noticed  decrease in her urinary output. PAST MEDICAL HISTORY:  Significant for history of substance abuse  including heroin and crystal meth, hyperlipidemia and history of  varicose veins. PAST SURGICAL HISTORY:  Significant for tubal ligation. ALLERGIES:  The patient has no known drug allergies. MEDICATIONS:  Prior to admission included meloxicam 7.5 mg daily and  Robaxin 500 mg twice a day. The patient's current medications in the hospital:  The patient is on IV  fluids in the form of 0.9 normal saline at the rate of 150 mL an hour. The patient has received intravenous vancomycin. She has received  intramuscular Tigan.   She is on Bactrim intravenously every 8 hours,  Zosyn 3.375 gm q.12 hours, Zofran 4 mg p.r.n., metronidazole 500 mg q.12  hours, melatonin 5 mg at bedtime, heparin 5000 units subcutaneous q.8  hours. SOCIAL HISTORY:  The patient has a history of illicit drug use including  heroin and crystal meth. She smokes less than a pack of cigarettes a  day. She denies any alcohol use. FAMILY HISTORY:  Significant for the demise of her father at the age of  28 from a cardiac arrest.  The patient's mother is alive and no  significant health problems. There is a history of diabetes in her  extended family. REVIEW OF SYSTEMS:  Negative for fever or chills. She has a  non-productive cough. No chest pain or palpitations. She has had  abdominal pain, nausea, and vomiting. No diarrhea. No dysuria,  hesitancy, urgency, increased or decreased frequency of micturition. Rest of the review of systems is negative. PHYSICAL EXAMINATION:  GENERAL:  The patient is awake and alert. She is in no acute distress. VITAL SIGNS:  Blood pressure is 125/76, pulse is 76, respiratory rate  22, and temperature 98.3 degrees Fahrenheit. HEENT:  Head is normocephalic and atraumatic. Eyes:  Sclerae are  nonicteric. Pupils are equal and reactive to light and accommodation. Fundus examination is deferred. Mouth and throat:  Dry oral mucosa  without any mucosal ulceration or exudate. NECK:  Supple. No distention. No carotid bruits. No palpable masses. CHEST:  Symmetrical.  LUNGS:  Vesicular breath sounds. No rales or rhonchi. HEART:  Regular rate and rhythm without any pericardial rub or gallop. ABDOMEN:  Soft, nontender. Normal bowel sounds. No rebound tenderness. No palpable masses. EXTREMITIES:  No pedal edema. LABORATORY DATA:  Most recent labs show a sodium 133 mmol/L, potassium  3.1 mmol/L, chloride 92 mmol/L, CO2 of 30 mmol/L, BUN 52 mg/dL,  creatinine 1.9 mg/dL, phosphorus 3.3 mg/dL.  pg/mL from last  night. Calcium 1.03 ionized last night. Total calcium was 8.4 mg/dL. Drug screen was positive for fentanyl. Vitamin D level was 28. Hemoglobin 14 gm/dL. IMPRESSION:  1. Acute kidney injury. Acute kidney injury most likely secondary to  profound volume depletion, although the patient does not have low  urinary fractional excretion of sodium or urea. The fact that her renal  function has improved with hydration with good urinary output suggests  volume depletion to be the primary factor in the setting of use of _____  ACOSTA-2 agents. We will cut back the rate of IV fluids. 2.  Hypokalemia. Hypokalemia is secondary to obligatory potassium  losses with recovering renal function. We will supplement potassium. Magnesium level is adequate. 3.  Hypocalcemia. Hypocalcemia of undetermined etiology. The patient  does have elevated PTH levels with rather fair vitamin D levels. We  will follow and supplement calcium and repeat PTH levels. 4.  Dehydration. Hydrate. Cut back the rate of IV fluids. PLAN:  Plan is to continue hydration. Monitor renal function on  Bactrim. The patient is being worked up for HIV. Perhaps we should  discontinue Bactrim, if no further needed. Rest of plans per orders. Thank you for allowing me to participate in the care of this patient. We will follow the patient with you.         Mundo Castaneda MD    D: 08/22/2022 13:02:27       T: 08/22/2022 14:29:52     [email or web address]  Job#: [id number]     Doc#: [id number]    CC:

## 2022-08-22 NOTE — H&P
Department of Internal Medicine  History and Physical    Admitting Physician: Dr. Pamella Daigle  Consultants: Critical care, Nephrology, Cardiology      CHIEF COMPLAINT:  Opioid withdrawal    HISTORY OF PRESENT ILLNESS:    Manan Stephens is a 79-year-old female who presented to [address] [place] [address] emergency department directly from [facility]. She is an abuser of heroin and crystal meth. She was incarcerated this past Tuesday and has developed worsened withdrawal symptomatology. She was brought to the emergency department in the setting of intractable abdominal discomfort with intolerance to oral ingestion. She was found to be in acute renal failure and has responded very favorably to IV fluid resuscitation. She is feeling somewhat better today but remains intermittently somnolent during my examination. We discussed her drug abuse history. She has been evaluated by multiple subspecialist in the setting of laboratory abnormalities. PAST MEDICAL Hx:  Past Medical History:   Diagnosis Date    Hyperlipemia 3/24/2014    Substance abuse     Varicose vein        PAST SURGICAL Hx:   Past Surgical History:   Procedure Laterality Date    TUBAL LIGATION  2013       FAMILY Hx:  Family History   Problem Relation Age of Onset    Heart Disease Father          of heart attack at age 28    Diabetes Paternal Grandmother     Diabetes Other         Aunt    Diabetes Other         Uncle    COPD Paternal Grandfather        HOME MEDICATIONS:  Prior to Admission medications    Medication Sig Start Date End Date Taking? Authorizing Provider   meloxicam (MOBIC) 7.5 MG tablet Take 7.5 mg by mouth daily   Yes Historical Provider, MD   methocarbamol (ROBAXIN) 500 MG tablet Take 500 mg by mouth 2 times daily   Yes Historical Provider, MD       ALLERGIES:  Patient has no known allergies.     SOCIAL Hx:  Social History     Socioeconomic History    Marital status: Single     Spouse name: Not on file    Number of children: Not on file Years of education: Not on file    Highest education level: Not on file   Occupational History    Not on file   Tobacco Use    Smoking status: Every Day     Packs/day: 0.50     Years: 14.00     Pack years: 7.00     Types: Cigarettes    Smokeless tobacco: Current   Substance and Sexual Activity    Alcohol use: No    Drug use: Yes    Sexual activity: Yes     Partners: Male   Other Topics Concern    Not on file   Social History Narrative    Not on file     Social Determinants of Health     Financial Resource Strain: Not on file   Food Insecurity: Not on file   Transportation Needs: Not on file   Physical Activity: Not on file   Stress: Not on file   Social Connections: Not on file   Intimate Partner Violence: Not on file   Housing Stability: Not on file       ROS:  General:   Admits to generalized malaise and fatigue. Psychological:   Denies anxiety, disorientation or hallucinations    ENT:    Denies epistaxis, headaches, vertigo or visual changes    Cardiovascular:   Denies any chest pain, irregular heartbeats, or palpitations. No paroxysmal nocturnal dyspnea. Respiratory:   Denies shortness of breath, coughing, sputum production, hemoptysis, or wheezing. No orthopnea. Gastrointestinal:   Admits to some improvement in her presenting abdominal discomfort and nausea. Genito-Urinary:    Denies any urgency, frequency, hematuria. Voiding without difficulty. Musculoskeletal:   Denies joint pain, joint stiffness, joint swelling or muscle pain    Neurology:    Denies any headache or focal neurological deficits. No weakness or paresthesia. Derm:    Denies any rashes, ulcers, or excoriations. Denies bruising. Extremities:   Denies any lower extremity swelling or edema. PHYSICAL EXAM:  VITALS:  Vitals:    08/22/22 0600   BP: (!) 135/98   Pulse: 83   Resp: 29   Temp:    SpO2:          CONSTITUTIONAL:    Awake and alert. Ill-appearing overall.     EYES:    PERRL, EOMI, sclera clear, conjunctiva normal    ENT:    Normocephalic, atraumatic, sinuses nontender on palpation. External ears without lesions. Oral pharynx with moist mucus membranes. Tonsils without erythema or exudates. NECK:    Supple, symmetrical, trachea midline, no adenopathy, thyroid symmetric, not enlarged and no tenderness, skin normal, no bruits, no JVD    HEMATOLOGIC/LYMPHATICS:    No cervical lymphadenopathy and no supraclavicular lymphadenopathy    LUNGS:    Symmetric. No increased work of breathing, good air exchange, clear to auscultation bilaterally, no wheezes, rhonchi, or rales,     CARDIOVASCULAR:    Normal apical impulse, regular rate and rhythm, normal S1 and S2, no S3 or S4, and no murmur noted    ABDOMEN:    Mildly tender to palpation diffusely with voluntary guarding elicited. Bowel sounds are hypoactive. MUSCULOSKELETAL:    There is no redness, warmth, or swelling of the joints. Full range of motion noted. Motor strength is 5 out of 5 all extremities bilaterally. Tone is normal.    NEUROLOGIC:    Awake, alert, oriented to name, place and time. Cranial nerves II-XII are grossly intact. Motor is 5 out of 5 bilaterally. SKIN:    Extensive bruising in bilateral upper extremities. EXTREMITIES:    Peripheral pulses present. No edema, cyanosis, or swelling. OSTEOPATHIC:    Examined in seated and supine positions. Normal thoracic kyphosis and lumbar lordosis. No acute somatic dysfunction.     LABORATORY DATA:  CBC with Differential:    Lab Results   Component Value Date/Time    WBC 26.5 08/22/2022 06:08 AM    RBC 4.61 08/22/2022 06:08 AM    HGB 14.0 08/22/2022 06:08 AM    HCT 40.5 08/22/2022 06:08 AM     08/22/2022 06:08 AM    MCV 87.9 08/22/2022 06:08 AM    MCH 30.4 08/22/2022 06:08 AM    MCHC 34.6 08/22/2022 06:08 AM    RDW 12.2 08/22/2022 06:08 AM    SEGSPCT 63 09/06/2013 09:40 AM    LYMPHOPCT 19.3 08/21/2022 07:38 PM    MONOPCT 6.7 08/21/2022 07:38 PM    BASOPCT 0.4 08/21/2022 07:38 PM    MONOSABS 1.60 08/21/2022 07:38 PM    LYMPHSABS 4.35 08/21/2022 07:38 PM    EOSABS 0.00 08/21/2022 07:38 PM    BASOSABS 0.00 08/21/2022 07:38 PM     BMP:    Lab Results   Component Value Date/Time     08/22/2022 06:08 AM    K 3.1 08/22/2022 06:08 AM    CL 92 08/22/2022 06:08 AM    CO2 30 08/22/2022 06:08 AM    BUN 52 08/22/2022 06:08 AM    LABALBU 3.3 08/22/2022 06:08 AM    CREATININE 1.9 08/22/2022 06:08 AM    CALCIUM 8.4 08/22/2022 06:08 AM    GFRAA 35 08/22/2022 06:08 AM    LABGLOM 29 08/22/2022 06:08 AM    GLUCOSE 97 08/22/2022 06:08 AM       ASSESSMENT:  Acute renal failure precipitated by opioid withdrawal  Extensive heroin/crystal meth abuse with subsequent withdrawal in the setting of current incarceration  STD with trichomonas  Bilateral pneumonia  Elevated troponin that is multifactorial in nature in the setting of demand ischemia and acute renal failure  History of hepatitis C  Multiple electrolyte derangements with ongoing improvement in the setting of fluid resuscitation    PLAN:  We appreciate the input of the multiple subspecialists that have been asked to provide consultation. Harrison Tompkins has responded very favorably to IV fluid resuscitation with ongoing downward trending creatinine. She continues to exhibit signs of opioid withdrawal and COWS protocol has been instituted. Diet will be advanced as tolerated. Electrolyte derangements are also being addressed. In the setting of elevated troponin, 2D echocardiogram will be obtained and the cardiovascular team has been asked to provide consultation. Antibiotic therapy will be employed for coverage of both pneumonia as well as trichomonas. Pancultures have been obtained and are pending. The patient is a senior living hold and will return to senior living upon discharge. Possible transfer from the intensive care unit as the day progresses. Greater than 40 minutes of critical care time was spent with the patient.   This time included chart review, , and discussion with those consultants involved in the patient's care.       León Cook DO, D.O., [facility]  9:28 AM  8/22/2022    Electronically signed by León Cook DO on 8/22/22 at 9:28 AM EDT

## 2022-08-22 NOTE — PROGRESS NOTES
Pharmacy Consultation Note  (Antibiotic Dosing and Monitoring)    Initial consult date: 8/22/22  Consulting physician/provider: Dr. Kezia Merida  Drug: Vancomycin  Indication: Sepsis of unknown etiology    Age/  Gender Height Weight IBW  Allergy Information   42 y.o./female 5' (152.4 cm) 130 lb (59 kg)     Ideal body weight: 45.5 kg (100 lb 4.9 oz)  Adjusted ideal body weight: 46.7 kg (102 lb 13.9 oz)   Patient has no known allergies. Renal Function:  Recent Labs     08/21/22  1938 08/22/22  0608   BUN 73* 52*   CREATININE 4.0* 1.9*       Intake/Output Summary (Last 24 hours) at 8/22/2022 0913  Last data filed at 8/22/2022 0600  Gross per 24 hour   Intake 1229.88 ml   Output --   Net 1229.88 ml       Vancomycin Monitoring:  Trough:  No results for input(s): VANCOTROUGH in the last 72 hours. Random:  No results for input(s): VANCORANDOM in the last 72 hours. Vancomycin Administration Times:  Recent vancomycin administrations        No vancomycin IV orders with administrations found. Assessment:  Patient is a 43 y.o. female who has been initiated on vancomycin  Estimated Creatinine Clearance: 28 mL/min (A) (based on SCr of 1.9 mg/dL (H)). To dose vancomycin, pharmacy will be utilizing dosing based off of levels because of patient's renal impairment/insufficiency  8/22: scr trending down 4.0 -> 1.9, baseline unknown. Plan:   Will dose vancomycin by level mg; give vancomycin 750 mg x1  Will check vancomycin level tomorrow with am labs   Will continue to monitor renal function   Clinical pharmacy to follow      Lee Hughes [address] [place] [address] 8/22/2022 9:13 AM

## 2022-08-23 ENCOUNTER — APPOINTMENT (OUTPATIENT)
Dept: CT IMAGING | Age: 42
DRG: 469 | End: 2022-08-23
Payer: COMMERCIAL

## 2022-08-23 LAB
(1,3)-BETA-D-GLUCAN (FUNGITELL) INTERPRETATION: NEGATIVE
(1,3)-BETA-D-GLUCAN (FUNGITELL): <31 PG/ML
ALBUMIN SERPL-MCNC: 3.2 G/DL (ref 3.5–5.2)
ALP BLD-CCNC: 49 U/L (ref 35–104)
ALT SERPL-CCNC: 14 U/L (ref 0–32)
ANION GAP SERPL CALCULATED.3IONS-SCNC: 10 MMOL/L (ref 7–16)
ANION GAP SERPL CALCULATED.3IONS-SCNC: 11 MMOL/L (ref 7–16)
ANTI-NUCLEAR ANTIBODY (ANA): NEGATIVE
AST SERPL-CCNC: 22 U/L (ref 0–31)
BILIRUB SERPL-MCNC: 0.3 MG/DL (ref 0–1.2)
BUN BLDV-MCNC: 15 MG/DL (ref 6–20)
BUN BLDV-MCNC: 9 MG/DL (ref 6–20)
CALCIUM IONIZED: 1.32 MMOL/L (ref 1.15–1.33)
CALCIUM SERPL-MCNC: 8.4 MG/DL (ref 8.6–10.2)
CALCIUM SERPL-MCNC: 8.6 MG/DL (ref 8.6–10.2)
CHLORIDE BLD-SCNC: 101 MMOL/L (ref 98–107)
CHLORIDE BLD-SCNC: 101 MMOL/L (ref 98–107)
CO2: 20 MMOL/L (ref 22–29)
CO2: 20 MMOL/L (ref 22–29)
CREAT SERPL-MCNC: 0.9 MG/DL (ref 0.5–1)
CREAT SERPL-MCNC: 1.1 MG/DL (ref 0.5–1)
GFR AFRICAN AMERICAN: >60
GFR AFRICAN AMERICAN: >60
GFR NON-AFRICAN AMERICAN: 54 ML/MIN/1.73
GFR NON-AFRICAN AMERICAN: >60 ML/MIN/1.73
GLUCOSE BLD-MCNC: 133 MG/DL (ref 74–99)
GLUCOSE BLD-MCNC: 91 MG/DL (ref 74–99)
HBV SURFACE AB TITR SER: REACTIVE {TITER}
HCT VFR BLD CALC: 34.1 % (ref 34–48)
HEMOGLOBIN: 11.4 G/DL (ref 11.5–15.5)
HEPATITIS B SURFACE ANTIGEN INTERPRETATION: NORMAL
HEPATITIS C ANTIBODY INTERPRETATION: REACTIVE
LIPASE: 85 U/L (ref 13–60)
MAGNESIUM: 1.8 MG/DL (ref 1.6–2.6)
MAGNESIUM: 2.2 MG/DL (ref 1.6–2.6)
MCH RBC QN AUTO: 30.1 PG (ref 26–35)
MCHC RBC AUTO-ENTMCNC: 33.4 % (ref 32–34.5)
MCV RBC AUTO: 90 FL (ref 80–99.9)
ORGANISM: ABNORMAL
PDW BLD-RTO: 11.8 FL (ref 11.5–15)
PHOSPHORUS: 0.9 MG/DL (ref 2.5–4.5)
PHOSPHORUS: 2.3 MG/DL (ref 2.5–4.5)
PLATELET # BLD: 215 E9/L (ref 130–450)
PMV BLD AUTO: 10.3 FL (ref 7–12)
POTASSIUM SERPL-SCNC: 3.2 MMOL/L (ref 3.5–5)
POTASSIUM SERPL-SCNC: 3.5 MMOL/L (ref 3.5–5)
RBC # BLD: 3.79 E12/L (ref 3.5–5.5)
SODIUM BLD-SCNC: 131 MMOL/L (ref 132–146)
SODIUM BLD-SCNC: 132 MMOL/L (ref 132–146)
TOTAL PROTEIN: 5.8 G/DL (ref 6.4–8.3)
VANCOMYCIN RANDOM: <4 MCG/ML (ref 5–40)
WBC # BLD: 9.7 E9/L (ref 4.5–11.5)

## 2022-08-23 PROCEDURE — 6370000000 HC RX 637 (ALT 250 FOR IP): Performed by: INTERNAL MEDICINE

## 2022-08-23 PROCEDURE — 2580000003 HC RX 258: Performed by: INTERNAL MEDICINE

## 2022-08-23 PROCEDURE — 6370000000 HC RX 637 (ALT 250 FOR IP): Performed by: NURSE PRACTITIONER

## 2022-08-23 PROCEDURE — 6360000002 HC RX W HCPCS: Performed by: INTERNAL MEDICINE

## 2022-08-23 PROCEDURE — 83690 ASSAY OF LIPASE: CPT

## 2022-08-23 PROCEDURE — 6360000002 HC RX W HCPCS: Performed by: NURSE PRACTITIONER

## 2022-08-23 PROCEDURE — 6360000004 HC RX CONTRAST MEDICATION: Performed by: RADIOLOGY

## 2022-08-23 PROCEDURE — 80202 ASSAY OF VANCOMYCIN: CPT

## 2022-08-23 PROCEDURE — 97161 PT EVAL LOW COMPLEX 20 MIN: CPT | Performed by: PHYSICAL THERAPIST

## 2022-08-23 PROCEDURE — 1200000000 HC SEMI PRIVATE

## 2022-08-23 PROCEDURE — 85027 COMPLETE CBC AUTOMATED: CPT

## 2022-08-23 PROCEDURE — 80053 COMPREHEN METABOLIC PANEL: CPT

## 2022-08-23 PROCEDURE — 82330 ASSAY OF CALCIUM: CPT

## 2022-08-23 PROCEDURE — 6370000000 HC RX 637 (ALT 250 FOR IP): Performed by: STUDENT IN AN ORGANIZED HEALTH CARE EDUCATION/TRAINING PROGRAM

## 2022-08-23 PROCEDURE — 36592 COLLECT BLOOD FROM PICC: CPT

## 2022-08-23 PROCEDURE — 80048 BASIC METABOLIC PNL TOTAL CA: CPT

## 2022-08-23 PROCEDURE — 84100 ASSAY OF PHOSPHORUS: CPT

## 2022-08-23 PROCEDURE — 2500000003 HC RX 250 WO HCPCS: Performed by: NURSE PRACTITIONER

## 2022-08-23 PROCEDURE — 83735 ASSAY OF MAGNESIUM: CPT

## 2022-08-23 PROCEDURE — 2580000003 HC RX 258: Performed by: NURSE PRACTITIONER

## 2022-08-23 PROCEDURE — 94640 AIRWAY INHALATION TREATMENT: CPT

## 2022-08-23 PROCEDURE — 99232 SBSQ HOSP IP/OBS MODERATE 35: CPT | Performed by: INTERNAL MEDICINE

## 2022-08-23 PROCEDURE — 36415 COLL VENOUS BLD VENIPUNCTURE: CPT

## 2022-08-23 PROCEDURE — 2500000003 HC RX 250 WO HCPCS: Performed by: INTERNAL MEDICINE

## 2022-08-23 PROCEDURE — 6370000000 HC RX 637 (ALT 250 FOR IP)

## 2022-08-23 RX ORDER — POTASSIUM CHLORIDE 7.45 MG/ML
10 INJECTION INTRAVENOUS PRN
Status: DISCONTINUED | OUTPATIENT
Start: 2022-08-23 | End: 2022-08-25 | Stop reason: HOSPADM

## 2022-08-23 RX ORDER — POTASSIUM CHLORIDE AND SODIUM CHLORIDE 900; 300 MG/100ML; MG/100ML
INJECTION, SOLUTION INTRAVENOUS CONTINUOUS
Status: DISCONTINUED | OUTPATIENT
Start: 2022-08-23 | End: 2022-08-25 | Stop reason: HOSPADM

## 2022-08-23 RX ORDER — ACETAMINOPHEN 325 MG/1
650 TABLET ORAL EVERY 4 HOURS PRN
Status: DISCONTINUED | OUTPATIENT
Start: 2022-08-23 | End: 2022-08-25 | Stop reason: HOSPADM

## 2022-08-23 RX ORDER — MAGNESIUM SULFATE 1 G/100ML
1000 INJECTION INTRAVENOUS PRN
Status: DISCONTINUED | OUTPATIENT
Start: 2022-08-23 | End: 2022-08-25 | Stop reason: HOSPADM

## 2022-08-23 RX ORDER — POTASSIUM CHLORIDE 20 MEQ/1
40 TABLET, EXTENDED RELEASE ORAL PRN
Status: DISCONTINUED | OUTPATIENT
Start: 2022-08-23 | End: 2022-08-25 | Stop reason: HOSPADM

## 2022-08-23 RX ORDER — ACETAMINOPHEN 325 MG/1
650 TABLET ORAL EVERY 4 HOURS PRN
Status: DISCONTINUED | OUTPATIENT
Start: 2022-08-23 | End: 2022-08-23 | Stop reason: SDUPTHER

## 2022-08-23 RX ORDER — VITAMIN B COMPLEX
3000 TABLET ORAL DAILY
Status: DISCONTINUED | OUTPATIENT
Start: 2022-08-23 | End: 2022-08-25 | Stop reason: HOSPADM

## 2022-08-23 RX ORDER — BUPRENORPHINE 2 MG/1
4 TABLET SUBLINGUAL DAILY
Status: DISCONTINUED | OUTPATIENT
Start: 2022-08-23 | End: 2022-08-25 | Stop reason: HOSPADM

## 2022-08-23 RX ADMIN — IOPAMIDOL 1 ML: 755 INJECTION, SOLUTION INTRAVENOUS at 17:59

## 2022-08-23 RX ADMIN — DIBASIC SODIUM PHOSPHATE, MONOBASIC POTASSIUM PHOSPHATE AND MONOBASIC SODIUM PHOSPHATE 2 TABLET: 852; 155; 130 TABLET ORAL at 08:04

## 2022-08-23 RX ADMIN — PIPERACILLIN AND TAZOBACTAM 3375 MG: 3; .375 INJECTION, POWDER, FOR SOLUTION INTRAVENOUS at 13:10

## 2022-08-23 RX ADMIN — VANCOMYCIN HYDROCHLORIDE 1000 MG: 1 INJECTION, POWDER, LYOPHILIZED, FOR SOLUTION INTRAVENOUS at 10:18

## 2022-08-23 RX ADMIN — HEPARIN SODIUM 5000 UNITS: 5000 INJECTION INTRAVENOUS; SUBCUTANEOUS at 05:32

## 2022-08-23 RX ADMIN — POTASSIUM PHOSPHATE, MONOBASIC AND POTASSIUM PHOSPHATE, DIBASIC 30 MMOL: 224; 236 INJECTION, SOLUTION, CONCENTRATE INTRAVENOUS at 08:06

## 2022-08-23 RX ADMIN — HEPARIN SODIUM 5000 UNITS: 5000 INJECTION INTRAVENOUS; SUBCUTANEOUS at 21:37

## 2022-08-23 RX ADMIN — ACETAMINOPHEN 650 MG: 325 TABLET ORAL at 19:35

## 2022-08-23 RX ADMIN — POTASSIUM CHLORIDE AND SODIUM CHLORIDE: 900; 300 INJECTION, SOLUTION INTRAVENOUS at 10:21

## 2022-08-23 RX ADMIN — IPRATROPIUM BROMIDE AND ALBUTEROL SULFATE 1 AMPULE: .5; 2.5 SOLUTION RESPIRATORY (INHALATION) at 10:24

## 2022-08-23 RX ADMIN — MUPIROCIN: 20 OINTMENT TOPICAL at 21:38

## 2022-08-23 RX ADMIN — CLONIDINE HYDROCHLORIDE 0.1 MG: 0.1 TABLET ORAL at 02:16

## 2022-08-23 RX ADMIN — HEPARIN SODIUM 5000 UNITS: 5000 INJECTION INTRAVENOUS; SUBCUTANEOUS at 16:35

## 2022-08-23 RX ADMIN — Medication 3000 UNITS: at 10:11

## 2022-08-23 RX ADMIN — SULFAMETHOXAZOLE AND TRIMETHOPRIM 121.6 MG: 80; 16 INJECTION, SOLUTION, CONCENTRATE INTRAVENOUS at 03:39

## 2022-08-23 RX ADMIN — IPRATROPIUM BROMIDE AND ALBUTEROL SULFATE 1 AMPULE: .5; 2.5 SOLUTION RESPIRATORY (INHALATION) at 19:29

## 2022-08-23 RX ADMIN — METRONIDAZOLE 500 MG: 500 TABLET ORAL at 08:03

## 2022-08-23 RX ADMIN — SULFAMETHOXAZOLE AND TRIMETHOPRIM 121.6 MG: 80; 16 INJECTION, SOLUTION, CONCENTRATE INTRAVENOUS at 13:08

## 2022-08-23 RX ADMIN — SODIUM CHLORIDE 100 ML/HR: 9 INJECTION, SOLUTION INTRAVENOUS at 00:00

## 2022-08-23 RX ADMIN — BUPRENORPHINE 4 MG: 2 TABLET SUBLINGUAL at 11:45

## 2022-08-23 RX ADMIN — MUPIROCIN: 20 OINTMENT TOPICAL at 11:53

## 2022-08-23 RX ADMIN — IPRATROPIUM BROMIDE AND ALBUTEROL SULFATE 1 AMPULE: .5; 2.5 SOLUTION RESPIRATORY (INHALATION) at 04:42

## 2022-08-23 RX ADMIN — METRONIDAZOLE 500 MG: 500 TABLET ORAL at 21:37

## 2022-08-23 RX ADMIN — ACETAMINOPHEN 650 MG: 325 TABLET ORAL at 10:11

## 2022-08-23 RX ADMIN — Medication 5 MG: at 21:37

## 2022-08-23 RX ADMIN — IPRATROPIUM BROMIDE AND ALBUTEROL SULFATE 1 AMPULE: .5; 2.5 SOLUTION RESPIRATORY (INHALATION) at 14:48

## 2022-08-23 RX ADMIN — PIPERACILLIN AND TAZOBACTAM 3375 MG: 3; .375 INJECTION, POWDER, FOR SOLUTION INTRAVENOUS at 00:43

## 2022-08-23 ASSESSMENT — PAIN SCALES - GENERAL
PAINLEVEL_OUTOF10: 2
PAINLEVEL_OUTOF10: 3

## 2022-08-23 NOTE — PLAN OF CARE
Problem: Discharge Planning  Goal: Discharge to home or other facility with appropriate resources  8/23/2022 1243 by Nas Santana RN  Outcome: Progressing     Problem: ABCDS Injury Assessment  Goal: Absence of physical injury  8/23/2022 1243 by Nas Santana RN  Outcome: Progressing     Problem: Safety - Adult  Goal: Free from fall injury  8/23/2022 1243 by Nas Santana RN  Outcome: Progressing     Problem: Metabolic/Fluid and Electrolytes - Adult  Goal: Electrolytes maintained within normal limits  Outcome: Progressing     Problem: Metabolic/Fluid and Electrolytes - Adult  Goal: Hemodynamic stability and optimal renal function maintained  Outcome: Progressing     Problem: Skin/Tissue Integrity - Adult  Goal: Skin integrity remains intact  Outcome: Progressing     Problem: Hematologic - Adult  Goal: Maintains hematologic stability  Outcome: Progressing     Problem: Nutrition Deficit:  Goal: Optimize nutritional status  8/23/2022 1243 by Nas Santana RN  Outcome: Progressing

## 2022-08-23 NOTE — PROGRESS NOTES
Internal Medicine Progress Note    [facility]    Critical access hospital. Raji Cesar., ANDIE.[email or web address]. Rasheeda Sánchez D.O., TANYAOOdinIOdin Mcdonald D.O. Antoni Moyer, MSN, APRN, NP-C  Joy Ashton. Leeanne Rubio, MSN, APRN-CNP     Primary Care Physician: Janet Silverman DO   Admitting Physician:  Britany Graf DO  Admission date and time: 8/21/2022  5:37 PM    Room:  [place] [address]  Admitting diagnosis: Dehydration [E86.0]  Hypochloremia [E87.8]  Hyperphosphatemia [E83.39]  Elevated troponin [R77.8]  Opiate withdrawal ([address] [F11.23]  Acute kidney injury ([address] [N17.9]  Nausea and vomiting, intractability of vomiting not specified, unspecified vomiting type [R11.2]  Acute pancreatitis, unspecified complication status, unspecified pancreatitis type [K85.90]    Patient Name: Live Cheatham  MRN: [id number]    Date of Service: 8/23/2022     Subjective:  Teresa Griffith is a 43 y.o. female who was seen and examined today,8/23/2022, at the bedside. Teresa Griffith seems to be doing much better today. She describes generalized aches and pains along with nausea related to her current withdrawal.  Her mentation is intact. Renal function has improved and she is tolerating her current treatment strategy. We discussed transferring from the intensive care unit. Review of System:   Constitutional:   Admits to generalized malaise and fatigue. HEENT:   Denies ear pain, sore throat, sinus or eye problems. Cardiovascular:   Denies any chest pain, irregular heartbeats, or palpitations. Respiratory:   Denies shortness of breath, coughing, sputum production, hemoptysis, or wheezing. Gastrointestinal:   Admits to abdominal discomfort with nausea. She describes intolerance to oral ingestion. Genitourinary:    Denies any urgency, frequency, hematuria. Voiding  without difficulty. Extremities:   Denies lower extremity swelling, edema or cyanosis.    Neurology:    Denies any headache or focal neurological deficits, Denies generalized weakness or memory difficulty. Psch:   Denies being anxious or depressed. Musculoskeletal:    Admits to body aches and pains. Integumentary:   Denies any rashes, ulcers, or excoriations. Denies bruising. Hematologic/Lymphatic:  Denies bruising or bleeding. Physical Exam:  No intake/output data recorded. Intake/Output Summary (Last 24 hours) at 8/23/2022 0914  Last data filed at 8/23/2022 0534  Gross per 24 hour   Intake 6764.82 ml   Output 1250 ml   Net 5514.82 ml   I/O last 3 completed shifts: In: 8044.7 [P.O.:850; I.V.:4360.8; IV Piggyback:2833.9]  Out: 1250 [Urine:1250]  Patient Vitals for the past 96 hrs (Last 3 readings):   Weight   08/23/22 0209 107 lb 12.8 oz (48.9 kg)   08/22/22 0235 106 lb 11.2 oz (48.4 kg)   08/21/22 1742 130 lb (59 kg)     Vital Signs:   Blood pressure 128/77, pulse 67, temperature 98.6 °F (37 °C), temperature source Oral, resp. rate 24, height 5' (1.524 m), weight 107 lb 12.8 oz (48.9 kg), SpO2 99 %, not currently breastfeeding. General appearance:  Alert, responsive, oriented to person, place, and time. Well preserved, alert, no distress. Head:  Normocephalic. No masses, lesions or tenderness. Eyes:  PERRLA. EOMI. Sclera clear. Buccal mucosa moist.  ENT:  Ears normal. Mucosa normal.  Neck:    Supple. Trachea midline. No thyromegaly. No JVD. No bruits. Heart:    Rhythm regular. Rate controlled. No murmurs. Lungs:    Symmetrical. Clear to auscultation bilaterally. No wheezes. No rhonchi. No rales. Abdomen:   Soft. Non-tender. Non-distended. Bowel sounds positive. No organomegaly or masses. No pain on palpation. Extremities:    Peripheral pulses present. No peripheral edema. No ulcers. No cyanosis. No clubbing. Neurologic:    Alert x 3. No focal deficit. Cranial nerves grossly intact. No focal weakness. Psych:   Behavior is normal. Mood appears normal. Speech is not rapid and/or pressured.   Musculoskeletal:   Spine ROM normal. Muscular strength intact. Gait not assessed. Integumentary:  No rashes  Skin normal color and texture.   Genitalia/Breast:  Deferred    Medication:  Scheduled Meds:   potassium phosphate IVPB  30 mmol IntraVENous Once    phosphorus  500 mg Oral 4x Daily    melatonin  5 mg Oral Nightly    heparin (porcine)  5,000 Units SubCUTAneous 3 times per day    sulfamethoxazole-trimethoprim (BACTRIM) IVPB  2.5 mg/kg IntraVENous Q8H    piperacillin-tazobactam  3,375 mg IntraVENous Q12H    metroNIDAZOLE  500 mg Oral 2 times per day    vancomycin (VANCOCIN) intermittent dosing (placeholder)   Other RX Placeholder    ipratropium-albuterol  1 ampule Inhalation 4x daily    nicotine  1 patch TransDERmal Daily     Continuous Infusions:   0.9% NaCl with KCl 40 mEq         Objective Data:  CBC with Differential:    Lab Results   Component Value Date/Time    WBC 9.7 08/23/2022 04:45 AM    RBC 3.79 08/23/2022 04:45 AM    HGB 11.4 08/23/2022 04:45 AM    HCT 34.1 08/23/2022 04:45 AM     08/23/2022 04:45 AM    MCV 90.0 08/23/2022 04:45 AM    MCH 30.1 08/23/2022 04:45 AM    MCHC 33.4 08/23/2022 04:45 AM    RDW 11.8 08/23/2022 04:45 AM    SEGSPCT 63 09/06/2013 09:40 AM    LYMPHOPCT 19.3 08/21/2022 07:38 PM    MONOPCT 6.7 08/21/2022 07:38 PM    BASOPCT 0.4 08/21/2022 07:38 PM    MONOSABS 1.60 08/21/2022 07:38 PM    LYMPHSABS 4.35 08/21/2022 07:38 PM    EOSABS 0.00 08/21/2022 07:38 PM    BASOSABS 0.00 08/21/2022 07:38 PM     BMP:    Lab Results   Component Value Date/Time     08/23/2022 04:45 AM    K 3.2 08/23/2022 04:45 AM     08/23/2022 04:45 AM    CO2 20 08/23/2022 04:45 AM    BUN 15 08/23/2022 04:45 AM    LABALBU 3.2 08/23/2022 04:45 AM    CREATININE 1.1 08/23/2022 04:45 AM    CALCIUM 8.6 08/23/2022 04:45 AM    GFRAA >60 08/23/2022 04:45 AM    LABGLOM 54 08/23/2022 04:45 AM    GLUCOSE 133 08/23/2022 04:45 AM         Assessment:  Acute renal failure precipitated by opioid withdrawal  Extensive heroin/crystal meth abuse with subsequent withdrawal in the setting of current incarceration  STD with trichomonas  Bilateral pneumonia  Elevated troponin that is multifactorial in nature in the setting of demand ischemia and acute renal failure  History of hepatitis C  Multiple electrolyte derangements with ongoing improvement in the setting of fluid resuscitatio    Plan:   I am very pleased with Troy's overall improvement. We will maintain IV fluid resuscitation and begin de-escalating the rate. Renal function has essentially returned to baseline. We are advancing diet as she tolerates. Tylenol will be used for pain control. She is receiving antibiotics for coverage of bilateral pneumonia as well as the underlying STD. I have encouraged her to become increasingly ambulatory. 2D echocardiogram does not suggest any vegetation and cultures have been negative thus far. I anticipate further de-escalation of antibiotics tomorrow with probable discharge back to retirement. We will defer any additional ischemic work-up to the cardiovascular team as she is entirely asymptomatic from a cardiovascular standpoint. She is acceptable for transfer from the intensive care unit. Continue current therapy. See orders for further plan of care. Greater than 40 minutes of critical care time was spent with the patient. This time included chart review, , and discussion with those consultants involved in the patient's care. More than 50% of my time was spent at the bedside counseling/coordinating care with the patient and/or family with face to face contact. This time was spent reviewing notes and laboratory data as well as instructing and counseling the patient. Time I spent with the family or surrogate(s) is included only if the patient was incapable of providing the necessary information or participating in medical decisions.  I also discussed the differential diagnosis and all of the proposed management plans with the patient and individuals accompanying the patient. I am readily available for any further decision-making and intervention.        Norma Hubbard DO, BRIANA.C.O.I.  8/23/2022  9:14 AM

## 2022-08-23 NOTE — PROGRESS NOTES
Pharmacy Consultation Note  (Antibiotic Dosing and Monitoring)    Initial consult date: 8/22/22  Consulting physician/provider: Dr. Trena Sampson  Drug: Vancomycin  Indication: Sepsis of unknown etiology    Age/  Gender Height Weight IBW  Allergy Information   42 y.o./female 5' (152.4 cm) 130 lb (59 kg)     Ideal body weight: 45.5 kg (100 lb 4.9 oz)  Adjusted ideal body weight: 46.9 kg (103 lb 4.9 oz)   Patient has no known allergies. Renal Function:  Recent Labs     08/22/22  0608 08/22/22  1330 08/23/22  0445   BUN 52* 34* 15   CREATININE 1.9* 1.4* 1.1*         Intake/Output Summary (Last 24 hours) at 8/23/2022 0854  Last data filed at 8/23/2022 0534  Gross per 24 hour   Intake 6814.82 ml   Output 1250 ml   Net 5564.82 ml         Vancomycin Monitoring:  Trough:  No results for input(s): VANCOTROUGH in the last 72 hours. Random:    Recent Labs     08/23/22  0445   VANCORANDOM <4.0*     Recent vancomycin administrations                     vancomycin (VANCOCIN) 750 mg in dextrose 5 % 250 mL IVPB (mg) 750 mg New Bag 08/22/22 1013                     Assessment:  Patient is a 43 y.o. female who has been initiated on vancomycin  Estimated Creatinine Clearance: 48 mL/min (A) (based on SCr of 1.1 mg/dL (H)). To dose vancomycin, pharmacy will be utilizing dosing based off of levels because of patient's renal impairment/insufficiency  8/22: scr trending down 4.0 -> 1.9, baseline unknown. 8/23: scr 1.1, vanco level <4.0    Plan:   Will initiate vancomycin 1000 mg q18h  Will check vancomycin level when appropriate   Will continue to monitor renal function   Clinical pharmacy to follow      GORDON Longoria [facility] 8/23/2022 8:54 AM

## 2022-08-23 NOTE — PROGRESS NOTES
Physical Therapy    Physical Therapy Initial Evaluation/Plan of Care    Room #:  7118/7576-31  Patient Name: Pauline Holder  YOB: 1980  MRN: [id number]    Date of Service: 8/23/2022     Tentative placement recommendation: Home  Equipment recommendation: None      Evaluating Physical Therapist: Dawson Blanco, PT  #[id number]      Specific Provider Orders/Date/Referring Provider :  08/23/22 0915    PT eval and treat  Start:  08/23/22 0915,   End:  08/23/22 0915,   ONE TIME,   Standing Count:  1 Occurrences,   R         Clint Brenner DO     Admitting Diagnosis:   Dehydration [E86.0]  Hypochloremia [E87.8]  Hyperphosphatemia [E83.39]  Elevated troponin [R77.8]  Opiate withdrawal ([address] [F11.23]  Acute kidney injury ([address] [N17.9]  Nausea and vomiting, intractability of vomiting not specified, unspecified vomiting type [R11.2]  Acute pancreatitis, unspecified complication status, unspecified pancreatitis type [K85.90]    Admitted with  above : vomitting  Surgery: none  Visit Diagnoses         Codes    Acute pancreatitis, unspecified complication status, unspecified pancreatitis type     K85.90    Dehydration     E86.0    Nausea and vomiting, intractability of vomiting not specified, unspecified vomiting type     R11.2    Acute kidney injury ([address]     N17.9    Hypochloremia     E87.8    Hyperphosphatemia     E83.39    Elevated troponin     R77.8    Prolonged QT interval     R94.31    Acute electrocardiogram changes     R94.31            Patient Active Problem List   Diagnosis    Trigger thumb of left hand    Varicose veins of left lower extremity with pain    Vitamin D deficiency    Hyperlipemia    Pneumonia    Smoker    Varicose veins    Opiate withdrawal ([address]    Prolonged QT interval    Elevated troponin    Acute kidney injury ([address]    EKG, abnormal    Family history of early CAD        ASSESSMENT  No skilled needs identified; will discharge from services.   If condition changes, please reorder physical therapy. PHYSICAL THERAPY  PLAN OF CARE       Physical therapy plan of care is established based on physician order,  patient diagnosis and clinical assessment       Patient and or family understand(s) diagnosis, prognosis, and plan of care. Prior Level of Function: Patient ambulated independently    Rehab Potential: good   for baseline    Past medical history:   Past Medical History:   Diagnosis Date    Acute kidney injury ([facility] 8/22/2022    Hyperlipemia 3/24/2014    Substance abuse ([facility] [address]     Varicose vein      Past Surgical History:   Procedure Laterality Date    TUBAL LIGATION  2013 March       SUBJECTIVE:    Precautions: ,  prisoner  ,  hep c    Social history: Patient lives  in a  FPC   with No steps  to enter      Equipment owned: None,       AM-PAC Basic Mobility        [facility] Mob[facility]patient   How much difficulty turning over in bed?: None  How much difficulty sitting down on / standing up from a chair with arms?: None  How much difficulty moving from lying on back to sitting on side of bed?: None  How much help from another person moving to and from a bed to a chair?: None  How much help from another person needed to walk in hospital room?: None  How much help from another person for climbing 3-5 steps with a railing?: A Little  [facility] Mobility Raw Score : 23  AM-PAC Inpatient T-Scale Score : 56.93  Mobility Inpatient CMS 0-100% Score: 11.2  Mobility Inpatient CMS G-Code Modifier : CI    Nursing cleared patient for PT evaluation. The admitting diagnosis and active problem list as listed above have been reviewed prior to the initiation of this evaluation. OBJECTIVE;   Initial Evaluation  Date: 8/23/2022   Was pt agreeable to Eval/treatment? Yes   Pain level   0/10      Bed Mobility    Rolling: Independent    Supine to sit:  Independent    Sit to supine: Independent    Scooting: Independent     Transfers Sit to stand: Independent     Ambulation     125 feet using  no device with Independent       Stair negotiation: ascended and descended   Not assessed    ROM Within functional limits     Strength BUE:   4/5  RLE:  4/5  LLE:  4/5   Balance Sitting EOB:  good    Dynamic Standing:  good       Patient is Alert & Oriented x person, place, time, and situation and follows directions    Sensation:  Patient  denies numbness/tingling   Edema:  no   Endurance: good       Vitals: room air   Blood Pressure at rest  Blood Pressure during session    Heart Rate at rest   Heart Rate during session     SPO2 at rest 100%  SPO2 during session 100%     Patient education  Patient educated on role of Physical Therapy, risks of immobility, safety and plan of care and  importance of mobility while in hospital      Patient response to education:   Pt verbalized understanding Pt demonstrated skill Pt requires further education in this area   Yes Yes No             At end of session, patient in bed with  call light and phone within reach,  all lines and tubes intact, nursing notified. Patient would benefit from continued skilled Physical Therapy to improve functional independence and quality of life. Patient's/ family goals   Have less pain     Time in  528  Time out  543    Total Treatment Time  0 minutes    Evaluation time includes thorough review of current medical information, gathering information on past medical history/social history and prior level of function, completion of standardized testing/informal observation of tasks, assessment of data, and development of Plan of care and goals.      CPT codes:  Low Complexity PT evaluation (54223)  No treatment    Monica Arndt, PT

## 2022-08-23 NOTE — PROGRESS NOTES
NEPHROLOGY Attending   Progress Note  8/23/2022 11:40 AM  Subjective:   Admit Date: 8/21/2022  PCP: Prashant Burrell DO    Interval History:    8/23/2022: No adverse events overnight - patient does state that she is having intermittent nausea and myalgias - polic remain at the bedside - vitals are stable - for transfer out of the ICU setting today - discussed with the ICU staff        Diet: ADULT DIET;  Regular  ADULT ORAL NUTRITION SUPPLEMENT; Lunch, Dinner, Breakfast; Standard High Calorie/High Protein Oral Supplement    Data:   Scheduled Meds:   potassium phosphate IVPB  30 mmol IntraVENous Once    phosphorus  500 mg Oral 4x Daily    Vitamin D  3,000 Units Oral Daily    vancomycin  1,000 mg IntraVENous Q18H    mupirocin   Topical BID    buprenorphine  4 mg SubLINGual Daily    melatonin  5 mg Oral Nightly    heparin (porcine)  5,000 Units SubCUTAneous 3 times per day    sulfamethoxazole-trimethoprim (BACTRIM) IVPB  2.5 mg/kg IntraVENous Q8H    piperacillin-tazobactam  3,375 mg IntraVENous Q12H    metroNIDAZOLE  500 mg Oral 2 times per day    ipratropium-albuterol  1 ampule Inhalation 4x daily    nicotine  1 patch TransDERmal Daily     Continuous Infusions:   0.9% NaCl with KCl 40 mEq 75 mL/hr at 08/23/22 1021     PRN Meds:acetaminophen, potassium chloride **OR** potassium alternative oral replacement **OR** potassium chloride, sodium phosphate IVPB **OR** sodium phosphate IVPB, magnesium sulfate, perflutren lipid microspheres, cloNIDine    Intake/Output Summary (Last 24 hours) at 8/23/2022 1140  Last data filed at 8/23/2022 0534  Gross per 24 hour   Intake 6764.82 ml   Output 1250 ml   Net 5514.82 ml     CBC:   Recent Labs     08/21/22  1938 08/22/22  0608 08/23/22  0445   WBC 22.9* 26.5* 9.7   HGB 20.6* 14.0 11.4*   * 304 215     BMP:    Recent Labs     08/22/22  0608 08/22/22  1330 08/23/22  0445    131* 132   K 3.1* 3.7 3.2*   CL 92* 96* 101   CO2 30* 23 20*   BUN 52* 34* 15   CREATININE 1.9* 1.4* 1.1*   GLUCOSE 97 169* 133*     Hepatic:   Recent Labs     08/22/22  0608 08/22/22  1330 08/23/22  0445   AST 25 21 22   ALT 12 13 14   BILITOT 0.4 0.4 0.3   ALKPHOS 63 55 49     Troponin: No results for input(s): TROPONINI in the last 72 hours. BNP: No results for input(s): BNP in the last 72 hours. Lipids: No results for input(s): CHOL, HDL in the last 72 hours. Invalid input(s): LDLCALCU  ABGs: No results found for: PHART, PO2ART, JJQ4JSJ  INR: No results for input(s): INR in the last 72 hours. -----------------------------------------------------------------  RAD:     CT ABDOMEN PELVIS WO CONTRAST Additional Contrast? None    Result Date: 8/22/2022  EXAMINATION: CT OF THE ABDOMEN AND PELVIS WITHOUT CONTRAST 8/21/2022 10:54 pm TECHNIQUE: CT of the abdomen and pelvis was performed without the administration of intravenous contrast. Multiplanar reformatted images are provided for review. Automated exposure control, iterative reconstruction, and/or weight based adjustment of the mA/kV was utilized to reduce the radiation dose to as low as reasonably achievable. COMPARISON: None. HISTORY: ORDERING SYSTEM PROVIDED HISTORY: epigastric pain, elevated lipase TECHNOLOGIST PROVIDED HISTORY: Reason for exam:->epigastric pain, elevated lipase Additional Contrast?->None Decision Support Exception - unselect if not a suspected or confirmed emergency medical condition->Emergency Medical Condition (MA) FINDINGS: Abdomen/Pelvis: Lower chest: Multifocal lower lung large pneumatoceles are seen with the largest visualized within the lateral segment of the right middle lobe which measures up to 4.6 cm in length. .  Pleural surfaces are unremarkable and no evidence of pleural effusion is identified. Organs: Reidel's lobe anatomical variant is present. The liver, gallbladder, spleen, pancreas, adrenal glands, kidneys, are otherwise unremarkable in appearance. GI/Bowel:  The stomach is unremarkable without wall thickening or distention. Small hiatal hernia is present. Bowel loops are unremarkable in appearance without evidence of obstruction, distension or mucosal thickening. Pelvis: The urinary bladder is minimally distended and grossly unremarkable in appearance in setting of [place] catheter placement. No evidence of pelvic free fluid is seen. Right femoral venous central catheter is noted without evidence of malpositioning seen. Peritoneum/Retroperitoneum: No evidence of retroperitoneal or intraperitoneal lymphadenopathy is identified. No evidence of intraperitoneal free fluid is seen. Bones/Soft Tissues: The bones, skeletal muscle bundles, fascial planes and subcutaneous soft tissues are unremarkable in appearance. 1. No CT evidence of acute pancreatitis on noncontrast evaluation. 2. Multifocal bilateral lower lung prominent pneumatoceles, as discussed above. 3. Small hiatal hernia. XR CHEST PORTABLE    Result Date: 8/21/2022  EXAMINATION: ONE XRAY VIEW OF THE CHEST 8/21/2022 7:46 pm COMPARISON: May 21, 2014 HISTORY: ORDERING SYSTEM PROVIDED HISTORY: SOB TECHNOLOGIST PROVIDED HISTORY: Reason for exam:->SOB FINDINGS: Lungs are normally expanded. No infiltrates, consolidates or pleural effusions are seen. Heart has normal size, mediastinum appears unremarkable. There is no perihilar vascular congestion. No acute cardiopulmonary process.          Objective:   Vitals:   Vitals:    08/23/22 0900   BP:    Pulse: 71   Resp: 22   Temp:    SpO2:      Patient Vitals for the past 24 hrs:   BP Temp Temp src Pulse Resp SpO2 Height Weight   08/23/22 1022 -- -- -- -- -- -- 5' (1.524 m) --   08/23/22 0900 -- -- -- 71 22 -- -- --   08/23/22 0800 (!) 122/105 97.8 °F (36.6 °C) Oral 74 20 100 % -- --   08/23/22 0700 125/77 -- -- 67 19 100 % -- --   08/23/22 0600 128/77 -- -- 67 24 99 % -- --   08/23/22 0500 135/79 -- -- 58 28 98 % -- --   08/23/22 0400 122/72 98.6 °F (37 °C) Oral 60 (!) 33 100 % -- --   08/23/22 0300 (!) 159/76 -- -- (!) 46 19 100 % -- --   08/23/22 0209 -- -- -- -- -- -- -- 107 lb 12.8 oz (48.9 kg)   08/23/22 0200 127/71 -- -- 75 (!) 38 99 % -- --   08/23/22 0100 (!) 145/83 -- -- 65 30 97 % -- --   08/23/22 0000 130/70 98.6 °F (37 °C) Oral 85 20 97 % -- --   08/22/22 2300 128/72 -- -- 71 (!) 32 97 % -- --   08/22/22 2200 (!) 154/92 -- -- 69 29 97 % -- --   08/22/22 2100 133/78 -- -- 63 30 100 % -- --   08/22/22 2000 127/89 97.4 °F (36.3 °C) Oral 77 18 98 % -- --   08/22/22 1900 123/77 -- -- 81 (!) 32 100 % -- --   08/22/22 1800 (!) 149/90 -- -- 61 29 99 % -- --   08/22/22 1700 (!) 140/81 -- -- 55 29 99 % -- --   08/22/22 1600 123/72 98.3 °F (36.8 °C) Oral 72 25 100 % -- --   08/22/22 1500 125/77 -- -- 74 21 97 % -- --   08/22/22 1400 (!) 152/96 -- -- 62 29 99 % -- --   08/22/22 1300 (!) 139/90 98.4 °F (36.9 °C) -- 58 25 100 % -- --   08/22/22 1200 115/71 98.4 °F (36.9 °C) Oral 81 (!) 31 99 % -- --     General appearance: alert, appears stated age and cooperative  Skin: Skin color, texture, turgor normal. No rashes or lesions  HEENT: Head: Normocephalic, no lesions, without obvious abnormality. Neck: no adenopathy, no carotid bruit, no JVD, supple, symmetrical, trachea midline, and thyroid not enlarged, symmetric, no tenderness/mass/nodules  Lungs: CTA  Heart: regular rate and rhythm, S1, S2 normal, no murmur, click, rub or gallop  Abdomen: soft, non-tender; bowel sounds normal; no masses,  no organomegaly  Extremities: extremities normal, atraumatic, no cyanosis or edema  Neurologic: Mental status: Alert, oriented, thought content appropriate      Assessment/Plan:     1. Acute kidney injury. Acute kidney injury most likely secondary to  profound volume depletion (although she does not have low FENa or urea) - her renal function has improved with hydration with good urinary output suggests volume depletion to be the primary factor in the setting of use of ACOSTA-2 agents - follow daily labs and strict intake and output    2. Hypokalemia. Hypokalemia secondary to obligatory potassium  losses with recovering renal function - continue to supplement potassium and magnesium as needed    3. Hypocalcemia. Hypocalcemia of undetermined etiology - she   does have elevated PTH levels with rather fair vitamin D levels -repeat PTH level 161 -serum calcium levels are stabilizing    4. Dehydration. Good response to initial aggressive IV hydration    5. Hypophosphatemia: Has received IV supplementation in the ICU setting - plan to repeat levels post supplementation              Daryle Inch, APRN - CNP      Patient seen and examined. Chart reviewed. I had a face to face encounter with the patient. Agree with exam.    Agree with  formulation, assessment and plan as outlined above and directed by me. Addition and corrections were done as deemed appropriate. My exam and plan include:     Continue current treatment as outlined above. Patient remains with poor oral intake.         Benny Sinha MD  Nephrology        Electronically signed by Benny Sinha MD on 8/23/2022 at 3:38 PM

## 2022-08-23 NOTE — CONSULTS
Consult received for \"drug abuse. \"    Patient admitted from [facility] due to acute renal failure. Apparently she has a history of abusing heroin while incarcerated and presented in opioid withdrawal. Notes from today reflect ongoing clinical improvement and patient is pending discharge back to [facility] tomorrow; I do not feel there are any acute psychiatric issues necessitating in a full consultation at this time.      Electronically signed by Abiola Zuniga MD on 8/23/2022 at 10:34 AM

## 2022-08-23 NOTE — PROGRESS NOTES
Comprehensive Nutrition Assessment    Type and Reason for Visit:  Initial (ICU - LOS)    Nutrition Recommendations/Plan:   Start Ensure Enlive TID to promote PO and optimize nutrition. Will continue to monitor. Malnutrition Assessment:  Malnutrition Status: At risk for malnutrition (Comment) (08/23/22 1027)    Context:  Chronic Illness     Findings of the 6 clinical characteristics of malnutrition:  Energy Intake:   (0% since admit - suspect minimal PO PTA)  Weight Loss:  Unable to assess     Body Fat Loss:  Unable to assess     Muscle Mass Loss:  Unable to assess    Fluid Accumulation:  No significant fluid accumulation     Strength:  Not Performed    Nutrition Assessment:    Pt adm d/t N/V w/ Polysubstance abuse/withdrawal in current incarceration, RIGO. Note hep C/STD, B/L PNA, possible pancreatitis/NSTEMI. Note pt refusing meal, will start ONS to promote PO & monitor. Nutrition Related Findings:    anxious/impulsive, soft/flat abd, +6.7L I/Os, no edema   Wound Type: None       Current Nutrition Intake & Therapies:    Average Meal Intake: 0%, Refusing to eat (per doc flow)  Average Supplements Intake: None Ordered  ADULT DIET; Regular    Anthropometric Measures:  Height: 5' (152.4 cm)  Ideal Body Weight (IBW): 100 lbs (45 kg)    Admission Body Weight: 106 lb 11.2 oz (48.4 kg) (8/22 actual)  Current Body Weight: 107 lb 12.8 oz (48.9 kg) (8/23 actual), 107.8 % IBW. Current BMI (kg/m2): 21.1  Usual Body Weight:  (lack measured wt hx per EMR)  Weight Adjustment For: No Adjustment  BMI Categories: Normal Weight (BMI 18.5-24. 9)    Estimated Daily Nutrient Needs:  Energy Requirements Based On: Formula  Weight Used for Energy Requirements: Current  Energy (kcal/day): 9246-6397  Weight Used for Protein Requirements: Ideal  Protein (g/day): 55-65  Fluid (ml/day): per critical care    Nutrition Diagnosis:   Inadequate oral intake related to psychological cause or life stress (drug abuse/withdrawal) as evidenced by intake 0-25%, poor intake prior to admission, nausea, vomiting    Nutrition Interventions:   Food and/or Nutrient Delivery: Continue Current Diet, Start Oral Nutrition Supplement (Ensure Enlive TID)  Nutrition Education/Counseling: No recommendation at this time  Coordination of Nutrition Care: No recommendation at this time    Goals:  Goals: PO intake 50% or greater    Nutrition Monitoring and Evaluation:   Behavioral-Environmental Outcomes: None Identified  Food/Nutrient Intake Outcomes: Food and Nutrient Intake, Supplement Intake  Physical Signs/Symptoms Outcomes: Biochemical Data, Nutrition Focused Physical Findings, Skin, Weight, Chewing or Swallowing, GI Status, Nausea or Vomiting, Fluid Status or Edema    Discharge Planning:     Too soon to determine     Mateusz Nazario2, MS, RD, LD  Contact: [phone]

## 2022-08-23 NOTE — CARE COORDINATION
8/23/2022 CM transition of care: ICU, pt screaming out at one point, Pt is a police hold from [facility]. Officer at pts bedside. Opioid withdrawal- fentanyl and crystal meth. IVF, Antibiotics, KPo4, Subutex, duonebs. Plans for return back to long-term at discharge.  Electronically signed by NIEVES Coronado on 8/23/2022 at 11:10 AM

## 2022-08-23 NOTE — CONSULTS
ICU Intensivist Attestation:    I saw, examined, and discussed the patient with Elicia CHE and agree with her assessment and plan. I have examined the patient, interviewed her, reviewed all of her labs and imaging studies. The patient has clearly improved although she is also going through drug withdrawal at this time and has a diminishing appetite. She is curled up in a fetal position and says she does not feel like eating. Therefore, it is no surprise that she is markedly hypophosphatemic. She will need replacement. Potassium is also low and will need replacement. Patient will continue to require IV fluids. Echocardiogram is consistent with diastolic dysfunction probably secondary to cocaine use which results in persistent hypertension. We will continue IV antibiotics for 1 more day to allow cultures to come back. If these are all negative, antibiotics will be able to be discontinued. We will also await her HIV test.  If a bed is needed in the ICU, she is a candidate to be transferred when she has had additional potassium phosphate supplementation.     Electronically signed by Eva Colmenares MD on 8/23/2022 at 9:16 AM   Assessment:  Polysubstance abuse  Severe dehydration with RIGO (improving)  Hypokalemia  Hypophosphatemia  Positive for hepatitis C  Possible pancreatitis  Possible NSTEMI  Rule out drug use-related endocarditis  Elevated troponin possibly secondary to cocaine  Multiple cystic areas of the lung compatible with pneumatoceles from longstanding snorting of toxic substances  On diastolic dysfunction per echocardiogram    Plan:  Continue IV fluids per nephrology's recommendations  Continue Flagyl, Zosyn, Bactrim and vancomycin till tomorrow, then DC if cultures are negative  Continue to monitor renal function  Replace potassium and phosphorus  Avoid using QT prolonging medications  COWS protocol  Cardiology following  DVT prophylaxis with heparin subcu  Taken CT of the head due to altered mental status and sepsis  Check lipid panel  Diet as tolerated    History of Present Illness: Patient is a 54-year-old female who presented with altered mental status. She has abused multiple substances including methamphetamine, fentanyl, and heroin. She continues to smoke 1-1/2 packs a day of cigarettes for the last 30 years. We are following her for intractable vomiting, acute kidney injury, possible NSTEMI, lactic acidosis, prolonged Qtc, history of hepatitis C and hyperlipidemia. Patient appears comfortable complaining of generalized aches and unable to sleep well. She is also complaining of nausea without any vomiting this is likely due to withdrawals. Vital signs remained stable and will continue to monitor her potassium and phosphorus will be replaced. Past Medical History:  Past Medical History:   Diagnosis Date    Acute kidney injury ([facility] [address] 2022    Hyperlipemia 3/24/2014    Substance abuse ([facility]     Varicose vein         Family History:   Family History   Problem Relation Age of Onset    Heart Disease Father          of heart attack at age 28    Diabetes Paternal Grandmother     Diabetes Other         Aunt    Diabetes Other         Uncle    COPD Paternal Grandfather        Allergies:         Patient has no known allergies.     Social history:  Social History     Socioeconomic History    Marital status: Single     Spouse name: Not on file    Number of children: Not on file    Years of education: Not on file    Highest education level: Not on file   Occupational History    Not on file   Tobacco Use    Smoking status: Every Day     Packs/day: 0.50     Years: 14.00     Pack years: 7.00     Types: Cigarettes    Smokeless tobacco: Current   Substance and Sexual Activity    Alcohol use: No    Drug use: Yes    Sexual activity: Yes     Partners: Male   Other Topics Concern    Not on file   Social History Narrative    Not on file     Social Determinants of Health     Financial Resource Strain: Not on file   Food Insecurity: Not on file   Transportation Needs: Not on file   Physical Activity: Not on file   Stress: Not on file   Social Connections: Not on file   Intimate Partner Violence: Not on file   Housing Stability: Not on file       Current Medications:     Current Facility-Administered Medications:     potassium phosphate 30 mmol in dextrose 5 % 250 mL IVPB, 30 mmol, IntraVENous, Once, Juan Brar MD, Last Rate: 41.7 mL/hr at 08/23/22 0806, 30 mmol at 08/23/22 0806    phosphorus (K PHOS NEUTRAL) tablet 2 tablet, 500 mg, Oral, 4x Daily, Juan Brar MD, 2 tablet at 08/23/22 6362    melatonin disintegrating tablet 5 mg, 5 mg, Oral, Nightly, EDITA Kerr CNP, 5 mg at 08/22/22 2115    heparin (porcine) injection 5,000 Units, 5,000 Units, SubCUTAneous, 3 times per day, EDITA Kerr CNP, 5,000 Units at 08/23/22 0532    sulfamethoxazole-trimethoprim (BACTRIM) 121.6 mg in dextrose 5 % 500 mL IVPB, 2.5 mg/kg, IntraVENous, Q8H, EDITA Kerr CNP, Stopped at 08/23/22 0516    [COMPLETED] piperacillin-tazobactam (ZOSYN) 3,375 mg in sodium chloride 0.9 % 50 mL IVPB (mini-bag), 3,375 mg, IntraVENous, Once, Stopped at 08/22/22 0756 **FOLLOWED BY** piperacillin-tazobactam (ZOSYN) 3,375 mg in sodium chloride 0.9 % 50 mL IVPB (mini-bag), 3,375 mg, IntraVENous, Q12H, EDITA Kerr CNP, Stopped at 08/23/22 0440    perflutren lipid microspheres (DEFINITY) injection 1.65 mg, 1.5 mL, IntraVENous, ONCE PRN, EDITA Alba CNP    metroNIDAZOLE (FLAGYL) tablet 500 mg, 500 mg, Oral, 2 times per day, Juan Brar MD, 500 mg at 08/23/22 0803    vancomycin (VANCOCIN) intermittent dosing (placeholder), , Other, RX Placeholder, Juan Brar MD    ipratropium-albuterol (DUONEB) nebulizer solution 1 ampule, 1 ampule, Inhalation, 4x daily, Juan Brar MD, 1 ampule at 08/23/22 0442    0.9 % sodium chloride infusion, 100 mL/hr, IntraVENous, Continuous, Nasra Martínez MD, Last Rate: 100 mL/hr at 08/23/22 0534, Rate Verify at 08/23/22 0534    cloNIDine (CATAPRES) tablet 0.1 mg, 0.1 mg, Oral, Q6H PRN, Verline Baas, DO, 0.1 mg at 08/23/22 0216    nicotine (NICODERM CQ) 14 MG/24HR 1 patch, 1 patch, TransDERmal, Daily, Verline Baas, DO, 1 patch at 08/23/22 5604    Review of Systems:   General: denies weight gain, denies loss of appetite, fever, chills, night sweats. HEENT: denies headaches, dizziness, head trauma, visual changes, eye pain, tinnitus, nosebleeds, hoarseness or throat pain    Respiratory: denies chest pain, dyspnea, cough and hemoptysis  Cardiovascular: denies orthopnea, paroxysmal nocturnal dyspnea, leg swelling, and previous heart attack. Gastrointestinal: denies pain, reports nausea, no vomiting, diarrhea, constipation, melena or bleeding. Genitourinary: denies hematuria, frequency, urgency or dysuria  Neurology: denies syncope, seizures, paralysis, paraesthesia   Endocrine: denies polyuria, polydipsia, skin or hair changes, and heat or cold intolerance  Musculoskeletal: denies joint pain, swelling, reports generalized achiness  Hematologic: denies bleeding, adenopathy and easy bruising  Skin: denies rashes and skin discoloration  Psychiatry: denies depression    Physical Exam:   Vital Signs:  /77   Pulse 67   Temp 98.6 °F (37 °C) (Oral)   Resp 24   Ht 5' (1.524 m)   Wt 107 lb 12.8 oz (48.9 kg)   SpO2 99%   BMI 21.05 kg/m²     Input/Output: In: 6814.8 [P.O.:730;  I.V.:3483.5]  Out: 1250     Oxygen requirements: room air           General appearance: Ill-appearing, not in pain or distress, in no respiratory distress    HEENT: Atraumatic/normocephalic, EOMI, EARLENE, pharynx clear, moist mucosa, redness of the uvula appreciated,   Neck: Supple, no jugular venous distension, lymphadenopathy, thyromegaly or carotid bruits  Chest: Equal normal breath sounds, expiratory wheezing, no crackles and no tenderness over ribs   Cardiovascular: Normal S1 , S2, regular rate and rhythm, no murmur, rub or gallop  Abdomen: Normal sounds present, soft, lax with no tenderness, no hepatosplenomegaly, and no masses  Extremities: No edema. Pulses are equally present. Skin: intact, no rashes   Neurologic: Alert and oriented x 3, No focal deficit     Investigations:  Labs, radiological imaging and cardiac work up were reviewed        ICU STAFF PHYSICIAN NOTE OF PERSONAL INVOLVEMENT IN CARE  As the attending physician, I certify that I personally reviewed the patient's history and personally examined the patient to confirm the physical findings described above, and that I reviewed the relevant imaging studies and available reports. I also discussed the differential diagnosis and all of the proposed management plans with the patient and individuals accompanying the patient to this visit. They had the opportunity to ask questions about the proposed management plans and to have those questions answered. This patient has a high probability of sudden, clinically significant deterioration, which requires the highest level of physician preparedness to intervene urgently. I managed/supervised life or organ supporting interventions that required frequent physician assessment. I devoted my full attention to the direct care of this patient for the amount of time indicated below. Time I spent with family or surrogate(s) is included only if the patient was incapable of providing the necessary information or participating in medical decision-making. Time devoted to teaching and to any procedures I billed separately is not included.   Critical Care Time: 35 minutes      Electronically signed by EDITA Valle CNP on 8/23/2022 at 8:34 AM

## 2022-08-24 ENCOUNTER — APPOINTMENT (OUTPATIENT)
Dept: NON INVASIVE DIAGNOSTICS | Age: 42
DRG: 469 | End: 2022-08-24
Payer: COMMERCIAL

## 2022-08-24 ENCOUNTER — APPOINTMENT (OUTPATIENT)
Dept: NUCLEAR MEDICINE | Age: 42
DRG: 469 | End: 2022-08-24
Payer: COMMERCIAL

## 2022-08-24 ENCOUNTER — APPOINTMENT (OUTPATIENT)
Dept: CT IMAGING | Age: 42
DRG: 469 | End: 2022-08-24
Payer: COMMERCIAL

## 2022-08-24 VITALS
TEMPERATURE: 98 F | WEIGHT: 107.8 LBS | OXYGEN SATURATION: 99 % | SYSTOLIC BLOOD PRESSURE: 132 MMHG | BODY MASS INDEX: 21.17 KG/M2 | HEIGHT: 60 IN | RESPIRATION RATE: 16 BRPM | DIASTOLIC BLOOD PRESSURE: 60 MMHG | HEART RATE: 64 BPM

## 2022-08-24 LAB
ALBUMIN SERPL-MCNC: 3.4 G/DL (ref 3.5–5.2)
ALP BLD-CCNC: 53 U/L (ref 35–104)
ALT SERPL-CCNC: 16 U/L (ref 0–32)
ANION GAP SERPL CALCULATED.3IONS-SCNC: 10 MMOL/L (ref 7–16)
AST SERPL-CCNC: 18 U/L (ref 0–31)
BILIRUB SERPL-MCNC: <0.2 MG/DL (ref 0–1.2)
BUN BLDV-MCNC: 7 MG/DL (ref 6–20)
CALCIUM IONIZED: 1.29 MMOL/L (ref 1.15–1.33)
CALCIUM SERPL-MCNC: 9 MG/DL (ref 8.6–10.2)
CHLORIDE BLD-SCNC: 104 MMOL/L (ref 98–107)
CHOLESTEROL, TOTAL: 147 MG/DL (ref 0–199)
CO2: 21 MMOL/L (ref 22–29)
CREAT SERPL-MCNC: 0.8 MG/DL (ref 0.5–1)
CULTURE, RESPIRATORY: NORMAL
EKG ATRIAL RATE: 106 BPM
EKG ATRIAL RATE: 84 BPM
EKG P AXIS: 13 DEGREES
EKG P AXIS: 41 DEGREES
EKG P-R INTERVAL: 104 MS
EKG P-R INTERVAL: 114 MS
EKG Q-T INTERVAL: 404 MS
EKG Q-T INTERVAL: 506 MS
EKG QRS DURATION: 76 MS
EKG QRS DURATION: 82 MS
EKG QTC CALCULATION (BAZETT): 477 MS
EKG QTC CALCULATION (BAZETT): 672 MS
EKG R AXIS: 71 DEGREES
EKG R AXIS: 83 DEGREES
EKG T AXIS: -88 DEGREES
EKG T AXIS: 77 DEGREES
EKG VENTRICULAR RATE: 106 BPM
EKG VENTRICULAR RATE: 84 BPM
GFR AFRICAN AMERICAN: >60
GFR NON-AFRICAN AMERICAN: >60 ML/MIN/1.73
GLUCOSE BLD-MCNC: 85 MG/DL (ref 74–99)
HCT VFR BLD CALC: 38.6 % (ref 34–48)
HDLC SERPL-MCNC: 57 MG/DL
HEMOGLOBIN: 12.7 G/DL (ref 11.5–15.5)
HIV-1 AND HIV-2 ANTIBODIES: NORMAL
LDL CHOLESTEROL CALCULATED: 66 MG/DL (ref 0–99)
LV EF: 68 %
LVEF MODALITY: NORMAL
MAGNESIUM: 2 MG/DL (ref 1.6–2.6)
MCH RBC QN AUTO: 29.8 PG (ref 26–35)
MCHC RBC AUTO-ENTMCNC: 32.9 % (ref 32–34.5)
MCV RBC AUTO: 90.6 FL (ref 80–99.9)
PDW BLD-RTO: 11.9 FL (ref 11.5–15)
PHOSPHORUS: 2.1 MG/DL (ref 2.5–4.5)
PLATELET # BLD: 234 E9/L (ref 130–450)
PMV BLD AUTO: 10.9 FL (ref 7–12)
POTASSIUM SERPL-SCNC: 3.8 MMOL/L (ref 3.5–5)
RBC # BLD: 4.26 E12/L (ref 3.5–5.5)
SMEAR, RESPIRATORY: NORMAL
SODIUM BLD-SCNC: 135 MMOL/L (ref 132–146)
TOTAL PROTEIN: 6.6 G/DL (ref 6.4–8.3)
TRIGL SERPL-MCNC: 119 MG/DL (ref 0–149)
VLDLC SERPL CALC-MCNC: 24 MG/DL
WBC # BLD: 7.7 E9/L (ref 4.5–11.5)

## 2022-08-24 PROCEDURE — 36415 COLL VENOUS BLD VENIPUNCTURE: CPT

## 2022-08-24 PROCEDURE — 93018 CV STRESS TEST I&R ONLY: CPT | Performed by: INTERNAL MEDICINE

## 2022-08-24 PROCEDURE — 82330 ASSAY OF CALCIUM: CPT

## 2022-08-24 PROCEDURE — 99232 SBSQ HOSP IP/OBS MODERATE 35: CPT | Performed by: INTERNAL MEDICINE

## 2022-08-24 PROCEDURE — 6360000002 HC RX W HCPCS: Performed by: INTERNAL MEDICINE

## 2022-08-24 PROCEDURE — 6360000002 HC RX W HCPCS: Performed by: NURSE PRACTITIONER

## 2022-08-24 PROCEDURE — A9500 TC99M SESTAMIBI: HCPCS | Performed by: RADIOLOGY

## 2022-08-24 PROCEDURE — 83735 ASSAY OF MAGNESIUM: CPT

## 2022-08-24 PROCEDURE — 76937 US GUIDE VASCULAR ACCESS: CPT

## 2022-08-24 PROCEDURE — 3430000000 HC RX DIAGNOSTIC RADIOPHARMACEUTICAL: Performed by: RADIOLOGY

## 2022-08-24 PROCEDURE — 93010 ELECTROCARDIOGRAM REPORT: CPT | Performed by: INTERNAL MEDICINE

## 2022-08-24 PROCEDURE — 84100 ASSAY OF PHOSPHORUS: CPT

## 2022-08-24 PROCEDURE — 6370000000 HC RX 637 (ALT 250 FOR IP): Performed by: STUDENT IN AN ORGANIZED HEALTH CARE EDUCATION/TRAINING PROGRAM

## 2022-08-24 PROCEDURE — 6370000000 HC RX 637 (ALT 250 FOR IP): Performed by: INTERNAL MEDICINE

## 2022-08-24 PROCEDURE — 80061 LIPID PANEL: CPT

## 2022-08-24 PROCEDURE — 93016 CV STRESS TEST SUPVJ ONLY: CPT | Performed by: INTERNAL MEDICINE

## 2022-08-24 PROCEDURE — 70460 CT HEAD/BRAIN W/DYE: CPT

## 2022-08-24 PROCEDURE — 78452 HT MUSCLE IMAGE SPECT MULT: CPT | Performed by: INTERNAL MEDICINE

## 2022-08-24 PROCEDURE — 2580000003 HC RX 258: Performed by: NURSE PRACTITIONER

## 2022-08-24 PROCEDURE — 97535 SELF CARE MNGMENT TRAINING: CPT

## 2022-08-24 PROCEDURE — 78452 HT MUSCLE IMAGE SPECT MULT: CPT

## 2022-08-24 PROCEDURE — 97165 OT EVAL LOW COMPLEX 30 MIN: CPT

## 2022-08-24 PROCEDURE — 94640 AIRWAY INHALATION TREATMENT: CPT

## 2022-08-24 PROCEDURE — 6360000004 HC RX CONTRAST MEDICATION: Performed by: RADIOLOGY

## 2022-08-24 PROCEDURE — 93017 CV STRESS TEST TRACING ONLY: CPT

## 2022-08-24 PROCEDURE — 6370000000 HC RX 637 (ALT 250 FOR IP)

## 2022-08-24 PROCEDURE — 85027 COMPLETE CBC AUTOMATED: CPT

## 2022-08-24 PROCEDURE — 2500000003 HC RX 250 WO HCPCS: Performed by: NURSE PRACTITIONER

## 2022-08-24 PROCEDURE — 80053 COMPREHEN METABOLIC PANEL: CPT

## 2022-08-24 RX ORDER — AMOXICILLIN AND CLAVULANATE POTASSIUM 875; 125 MG/1; MG/1
1 TABLET, FILM COATED ORAL 2 TIMES DAILY
Qty: 10 TABLET | Refills: 0 | Status: SHIPPED | OUTPATIENT
Start: 2022-08-24 | End: 2022-08-29

## 2022-08-24 RX ORDER — ERGOCALCIFEROL 1.25 MG/1
50000 CAPSULE ORAL WEEKLY
Qty: 4 CAPSULE | Refills: 0 | Status: SHIPPED | OUTPATIENT
Start: 2022-08-24 | End: 2022-08-24 | Stop reason: SDUPTHER

## 2022-08-24 RX ORDER — ERGOCALCIFEROL 1.25 MG/1
50000 CAPSULE ORAL WEEKLY
Qty: 4 CAPSULE | Refills: 0 | Status: SHIPPED | OUTPATIENT
Start: 2022-08-24

## 2022-08-24 RX ADMIN — IPRATROPIUM BROMIDE AND ALBUTEROL SULFATE 1 AMPULE: .5; 2.5 SOLUTION RESPIRATORY (INHALATION) at 07:26

## 2022-08-24 RX ADMIN — IPRATROPIUM BROMIDE AND ALBUTEROL SULFATE 1 AMPULE: .5; 2.5 SOLUTION RESPIRATORY (INHALATION) at 10:18

## 2022-08-24 RX ADMIN — Medication 3000 UNITS: at 14:29

## 2022-08-24 RX ADMIN — Medication 30 MILLICURIE: at 14:19

## 2022-08-24 RX ADMIN — ACETAMINOPHEN 650 MG: 325 TABLET ORAL at 05:17

## 2022-08-24 RX ADMIN — METRONIDAZOLE 500 MG: 500 TABLET ORAL at 14:23

## 2022-08-24 RX ADMIN — MUPIROCIN: 20 OINTMENT TOPICAL at 14:25

## 2022-08-24 RX ADMIN — HEPARIN SODIUM 5000 UNITS: 5000 INJECTION INTRAVENOUS; SUBCUTANEOUS at 05:18

## 2022-08-24 RX ADMIN — Medication 10 MILLICURIE: at 10:53

## 2022-08-24 RX ADMIN — BUPRENORPHINE 4 MG: 2 TABLET SUBLINGUAL at 10:24

## 2022-08-24 RX ADMIN — REGADENOSON 0.4 MG: 0.08 INJECTION, SOLUTION INTRAVENOUS at 13:41

## 2022-08-24 RX ADMIN — IOPAMIDOL 50 ML: 755 INJECTION, SOLUTION INTRAVENOUS at 16:44

## 2022-08-24 RX ADMIN — HEPARIN SODIUM 5000 UNITS: 5000 INJECTION INTRAVENOUS; SUBCUTANEOUS at 14:23

## 2022-08-24 RX ADMIN — SODIUM PHOSPHATE, MONOBASIC, MONOHYDRATE 15 MMOL: 276; 142 INJECTION, SOLUTION INTRAVENOUS at 14:17

## 2022-08-24 ASSESSMENT — PAIN DESCRIPTION - LOCATION: LOCATION: GENERALIZED

## 2022-08-24 ASSESSMENT — PAIN - FUNCTIONAL ASSESSMENT: PAIN_FUNCTIONAL_ASSESSMENT: ACTIVITIES ARE NOT PREVENTED

## 2022-08-24 ASSESSMENT — PAIN SCALES - GENERAL
PAINLEVEL_OUTOF10: 3
PAINLEVEL_OUTOF10: 3
PAINLEVEL_OUTOF10: 4
PAINLEVEL_OUTOF10: 0

## 2022-08-24 ASSESSMENT — PAIN DESCRIPTION - ORIENTATION: ORIENTATION: OTHER (COMMENT)

## 2022-08-24 ASSESSMENT — PAIN DESCRIPTION - DESCRIPTORS: DESCRIPTORS: ACHING

## 2022-08-24 NOTE — PROGRESS NOTES
Patient is seen in follow-up for elevated troponin    Subjective:     Ms. Lore Shahid feels better today  Lying in bed no apparent distress    ROS:  CONSTITUTIONAL:  negative for  fevers, chills  HEENT:  negative for earaches, nasal congestion and epistaxis  RESPIRATORY:  negative for  dry cough, cough with sputum,wheezing and hemoptysis  MUSCULOSKELETAL:  negative for  myalgias, arthralgias  NEUROLOGICAL:  negative for visual disturbance, dysphagia    Medication side effects: none    Scheduled Meds:   Vitamin D  3,000 Units Oral Daily    vancomycin  1,000 mg IntraVENous Q18H    buprenorphine  4 mg SubLINGual Daily    mupirocin   Nasal BID    melatonin  5 mg Oral Nightly    heparin (porcine)  5,000 Units SubCUTAneous 3 times per day    sulfamethoxazole-trimethoprim (BACTRIM) IVPB  2.5 mg/kg IntraVENous Q8H    piperacillin-tazobactam  3,375 mg IntraVENous Q12H    metroNIDAZOLE  500 mg Oral 2 times per day    ipratropium-albuterol  1 ampule Inhalation 4x daily    nicotine  1 patch TransDERmal Daily     Continuous Infusions:   0.9% NaCl with KCl 40 mEq 75 mL/hr at 08/23/22 1021     PRN Meds:acetaminophen, potassium chloride **OR** potassium alternative oral replacement **OR** potassium chloride, sodium phosphate IVPB **OR** sodium phosphate IVPB, magnesium sulfate, perflutren lipid microspheres, cloNIDine    I/O last 3 completed shifts: In: 8435.5 [P.O.:730; I.V.:4043.4; IV Piggyback:3662.1]  Out: 1250 [Urine:1250]  No intake/output data recorded.       Objective:      Physical Exam:   /62   Pulse 76   Temp 99.1 °F (37.3 °C) (Oral)   Resp 16   Ht 5' (1.524 m)   Wt 107 lb 12.8 oz (48.9 kg)   SpO2 100%   BMI 21.05 kg/m²   CONSTITUTIONAL:  awake, alert, cooperative, no apparent distress, and appears stated age  HEAD:  normocepalic, without obvious abnormality, atraumatic  NECK:  Supple, symmetrical, trachea midline, no adenopathy, thyroid symmetric, not enlarged and no tenderness, skin normal  LUNGS:  No increased work of breathing, No accessory muscle use or intercostal retractions, good air exchange, clear to auscultation bilaterally, no crackles or wheezing  CARDIOVASCULAR:  Normal apical impulse, regular rate and rhythm, normal S1 and S2, no S3 or S4, and no murmur noted, no edema, no JVD, no carotid bruit. ABDOMEN:  Soft, nontender, no masses, no hepatomegaly, no splenomegaly, BS+  MUSCULOSKELETAL:  No clubbing no cyanosis. there is no redness, warmth, or swelling of the joints  full range of motion noted  NEUROLOGIC:  Alert, awake,oriented x3  SKIN:  no bruising or bleeding, normal skin color, texture, turgor and no redness, warmth, or swelling      Cardiographics  Nonmonitored bed    Echocardiogram: 8/22/2022,Summary   No previous echo for comparison. Technically adequate study. Left ventricle size is normal.   Normal left ventricular wall thickness. Ejection fraction is visually estimated at 55%. No regional wall motion abnormalities seen. E/A flow reversal noted. Suggestive of diastolic dysfunction. Physiologic and/or trace tricuspid regurgitation. RVSP is normal.    Imaging  CT CHEST WO CONTRAST   Final Result   Areas of likely bullous formation thin walled cyst greatest in the left upper   lobe anteriorly versus paraseptal emphysema. At least mild central   bronchiectasis. No dominant nodule or mass. No acute process of   consolidation or pleural effusion         CT ABDOMEN PELVIS WO CONTRAST Additional Contrast? None   Final Result   1. No CT evidence of acute pancreatitis on noncontrast evaluation. 2. Multifocal bilateral lower lung prominent pneumatoceles, as discussed   above. 3. Small hiatal hernia. XR CHEST PORTABLE   Final Result   No acute cardiopulmonary process.          CT HEAD W CONTRAST    (Results Pending)       Lab Review   Lab Results   Component Value Date/Time     08/23/2022 04:20 PM    K 3.5 08/23/2022 04:20 PM     08/23/2022 04:20 PM    CO2 20 08/23/2022 04:20 PM    BUN 9 08/23/2022 04:20 PM    CREATININE 0.9 08/23/2022 04:20 PM    GLUCOSE 91 08/23/2022 04:20 PM    CALCIUM 8.4 08/23/2022 04:20 PM     Lab Results   Component Value Date/Time    WBC 9.7 08/23/2022 04:45 AM    HGB 11.4 08/23/2022 04:45 AM    HCT 34.1 08/23/2022 04:45 AM    MCV 90.0 08/23/2022 04:45 AM     08/23/2022 04:45 AM     I have personally reviewed the laboratory, cardiac diagnostic and radiographic testing as outlined above:    Assessment:     Elevated troponin: Secondary to comorbid conditions  Abnormal EKG: ST-T changes suggestive of ischemia, will schedule for Lexiscan stress test tomorrow  Prolonged QT interval: Secondary to electrolyte imbalance, comorbid conditions, ischemic?,  Will repeat EKG for further evaluation  Tobacco abuse  Polysubstance abuse  History of hep C  Family history of early CAD  8. Fentanyl withdrawal  9. Acute kidney injury    Recommendations:     1. Lexiscan stress test  2. Basic metabolic panel and CBC in a.m. 3. EKG in a.m.  4.  Further cardiac recommendations will be forthcoming pending her clinical course and the findings of her Lexiscan stress test    Discussed with patient  Electronically signed by Dell Peterson MD on 8/24/2022 at 5:15 AM  NOTE: This report was transcribed using voice recognition software.  Every effort was made to ensure accuracy; however, inadvertent computerized transcription errors may be present

## 2022-08-24 NOTE — PROGRESS NOTES
and no tenderness, skin normal  LUNGS:  No increased work of breathing, No accessory muscle use or intercostal retractions, good air exchange, clear to auscultation bilaterally, no crackles or wheezing  CARDIOVASCULAR:  Normal apical impulse, regular rate and rhythm, normal S1 and S2, no S3 or S4, and no murmur noted, no edema, no JVD, no carotid bruit. ABDOMEN:  Soft, nontender, no masses, no hepatomegaly, no splenomegaly, BS+  MUSCULOSKELETAL:  No clubbing no cyanosis. there is no redness, warmth, or swelling of the joints  full range of motion noted  NEUROLOGIC:  Alert, awake,oriented x3  SKIN:  no bruising or bleeding, normal skin color, texture, turgor and no redness, warmth, or swelling      Cardiographics  Nonmonitored bed    Echocardiogram: 8/22/2022,Summary   No previous echo for comparison. Technically adequate study. Left ventricle size is normal.   Normal left ventricular wall thickness. Ejection fraction is visually estimated at 55%. No regional wall motion abnormalities seen. E/A flow reversal noted. Suggestive of diastolic dysfunction. Physiologic and/or trace tricuspid regurgitation. RVSP is normal.    Imaging  CT CHEST WO CONTRAST   Final Result   Areas of likely bullous formation thin walled cyst greatest in the left upper   lobe anteriorly versus paraseptal emphysema. At least mild central   bronchiectasis. No dominant nodule or mass. No acute process of   consolidation or pleural effusion         CT ABDOMEN PELVIS WO CONTRAST Additional Contrast? None   Final Result   1. No CT evidence of acute pancreatitis on noncontrast evaluation. 2. Multifocal bilateral lower lung prominent pneumatoceles, as discussed   above. 3. Small hiatal hernia. XR CHEST PORTABLE   Final Result   No acute cardiopulmonary process.          CT HEAD W CONTRAST    (Results Pending)   NM Cardiac Stress Test Nuclear Imaging    (Results Pending)       Lab Review   Lab Results   Component Value Date/Time     08/24/2022 05:17 AM    K 3.8 08/24/2022 05:17 AM     08/24/2022 05:17 AM    CO2 21 08/24/2022 05:17 AM    BUN 7 08/24/2022 05:17 AM    CREATININE 0.8 08/24/2022 05:17 AM    GLUCOSE 85 08/24/2022 05:17 AM    CALCIUM 9.0 08/24/2022 05:17 AM     Lab Results   Component Value Date/Time    WBC 7.7 08/24/2022 05:17 AM    HGB 12.7 08/24/2022 05:17 AM    HCT 38.6 08/24/2022 05:17 AM    MCV 90.6 08/24/2022 05:17 AM     08/24/2022 05:17 AM     I have personally reviewed the laboratory, cardiac diagnostic and radiographic testing as outlined above:    Assessment:     Elevated troponin: Secondary to comorbid conditions  Abnormal EKG: ST-T changes suggestive of ischemia, will schedule for Lexiscan stress test tomorrow  Prolonged QT interval: Secondary to electrolyte imbalance, comorbid conditions, ischemic?,  Tobacco abuse  Polysubstance abuse  History of hep C  Family history of early CAD  8. Fentanyl withdrawal  9. Acute kidney injury    Recommendations:     1. Lexiscan stress test today  2. Continue current treatment  3. Further cardiac recommendations will be forthcoming pending her clinical course and the findings of her Lexiscan stress test    Discussed with patient  Electronically signed by Smita Castillo MD on 8/24/2022 at 1:48 PM  NOTE: This report was transcribed using voice recognition software.  Every effort was made to ensure accuracy; however, inadvertent computerized transcription errors may be present   Late entry note

## 2022-08-24 NOTE — DISCHARGE INSTRUCTIONS
Your information:  Name: Sylvia Bocanegra  : 1980    Your instructions:  Discharge to residential    Signs and symptoms to watch out for:  Call 911 anytime you think you may need emergency care. For example, call if:    You passed out (lost consciousness). Call your doctor now or seek immediate medical care if:    You have new or worse nausea and vomiting. You have much less urine than normal, or you have no urine. You are feeling confused or cannot think clearly. You have new or more blood in your urine. You have new swelling. You are dizzy or lightheaded, or feel like you may faint. Watch closely for changes in your health, and be sure to contact your doctor if:    You do not get better as expected. What to do after you leave the hospital:    Recommended diet: regular diet    Recommended activity: activity as tolerated        The following personal items were collected during your admission and were returned to you:    Belongings  Dental Appliances: None  Vision - Corrective Lenses: None  Hearing Aid: None  Clothing: Other (Comment) (orangebjail suit)  Jewelry: None  Body Piercings Removed: N/A  Electronic Devices: None  Weapons (Notify Protective Services/Security): None  Other Valuables: Other (Comment) (none)  Home Medications: None  Valuables Given To: Other (Comment) (none)  Provide Name(s) of Who Valuable(s) Were Given To: n/a  Responsible person(s) in the waiting room: n/a  Patient approves for provider to speak to responsible person post operatively: No    Information obtained by:  By signing below, I understand that if any problems occur once I leave the hospital I am to contact Dr. Vee See. I understand and acknowledge receipt of the instructions indicated above.

## 2022-08-24 NOTE — PROGRESS NOTES
NEPHROLOGY Attending   Progress Note  8/24/2022 8:55 AM  Subjective:   Admit Date: 8/21/2022  PCP: Bobbi Corley DO    Interval History:    8/24/2022: Has now transferred out of the ICU setting - no adverse events overnight - alert and oriented - n.p.o. for stress test today - police remain at the bedside        Diet: Diet NPO    Data:   Scheduled Meds:   Vitamin D  3,000 Units Oral Daily    vancomycin  1,000 mg IntraVENous Q18H    buprenorphine  4 mg SubLINGual Daily    mupirocin   Nasal BID    melatonin  5 mg Oral Nightly    heparin (porcine)  5,000 Units SubCUTAneous 3 times per day    sulfamethoxazole-trimethoprim (BACTRIM) IVPB  2.5 mg/kg IntraVENous Q8H    piperacillin-tazobactam  3,375 mg IntraVENous Q12H    metroNIDAZOLE  500 mg Oral 2 times per day    ipratropium-albuterol  1 ampule Inhalation 4x daily    nicotine  1 patch TransDERmal Daily     Continuous Infusions:   0.9% NaCl with KCl 40 mEq 75 mL/hr at 08/23/22 1021     PRN Meds:regadenoson, technetium sestamibi, acetaminophen, potassium chloride **OR** potassium alternative oral replacement **OR** potassium chloride, sodium phosphate IVPB **OR** sodium phosphate IVPB, magnesium sulfate, perflutren lipid microspheres, cloNIDine    Intake/Output Summary (Last 24 hours) at 8/24/2022 0855  Last data filed at 8/23/2022 1854  Gross per 24 hour   Intake 1620.66 ml   Output --   Net 1620.66 ml     CBC:   Recent Labs     08/22/22  0608 08/23/22  0445 08/24/22  0517   WBC 26.5* 9.7 7.7   HGB 14.0 11.4* 12.7    215 234     BMP:    Recent Labs     08/23/22  0445 08/23/22  1620 08/24/22  0517    131* 135   K 3.2* 3.5 3.8    101 104   CO2 20* 20* 21*   BUN 15 9 7   CREATININE 1.1* 0.9 0.8   GLUCOSE 133* 91 85     Hepatic:   Recent Labs     08/22/22  1330 08/23/22  0445 08/24/22  0517   AST 21 22 18   ALT 13 14 16   BILITOT 0.4 0.3 <0.2   ALKPHOS 55 49 53     Troponin: No results for input(s): TROPONINI in the last 72 hours.   BNP: No results for input(s): BNP in the last 72 hours. Lipids:   Recent Labs     08/24/22  0517   CHOL 147   HDL 57     ABGs: No results found for: PHART, PO2ART, KRA7ESZ  INR: No results for input(s): INR in the last 72 hours. -----------------------------------------------------------------  RAD:     CT ABDOMEN PELVIS WO CONTRAST Additional Contrast? None    Result Date: 8/22/2022  EXAMINATION: CT OF THE ABDOMEN AND PELVIS WITHOUT CONTRAST 8/21/2022 10:54 pm TECHNIQUE: CT of the abdomen and pelvis was performed without the administration of intravenous contrast. Multiplanar reformatted images are provided for review. Automated exposure control, iterative reconstruction, and/or weight based adjustment of the mA/kV was utilized to reduce the radiation dose to as low as reasonably achievable. COMPARISON: None. HISTORY: ORDERING SYSTEM PROVIDED HISTORY: epigastric pain, elevated lipase TECHNOLOGIST PROVIDED HISTORY: Reason for exam:->epigastric pain, elevated lipase Additional Contrast?->None Decision Support Exception - unselect if not a suspected or confirmed emergency medical condition->Emergency Medical Condition (MA) FINDINGS: Abdomen/Pelvis: Lower chest: Multifocal lower lung large pneumatoceles are seen with the largest visualized within the lateral segment of the right middle lobe which measures up to 4.6 cm in length. .  Pleural surfaces are unremarkable and no evidence of pleural effusion is identified. Organs: Reidel's lobe anatomical variant is present. The liver, gallbladder, spleen, pancreas, adrenal glands, kidneys, are otherwise unremarkable in appearance. GI/Bowel: The stomach is unremarkable without wall thickening or distention. Small hiatal hernia is present. Bowel loops are unremarkable in appearance without evidence of obstruction, distension or mucosal thickening. Pelvis: The urinary bladder is minimally distended and grossly unremarkable in appearance in setting of [place] catheter placement.   No evidence of pelvic free fluid is seen. Right femoral venous central catheter is noted without evidence of malpositioning seen. Peritoneum/Retroperitoneum: No evidence of retroperitoneal or intraperitoneal lymphadenopathy is identified. No evidence of intraperitoneal free fluid is seen. Bones/Soft Tissues: The bones, skeletal muscle bundles, fascial planes and subcutaneous soft tissues are unremarkable in appearance. 1. No CT evidence of acute pancreatitis on noncontrast evaluation. 2. Multifocal bilateral lower lung prominent pneumatoceles, as discussed above. 3. Small hiatal hernia. XR CHEST PORTABLE    Result Date: 8/21/2022  EXAMINATION: ONE XRAY VIEW OF THE CHEST 8/21/2022 7:46 pm COMPARISON: May 21, 2014 HISTORY: ORDERING SYSTEM PROVIDED HISTORY: SOB TECHNOLOGIST PROVIDED HISTORY: Reason for exam:->SOB FINDINGS: Lungs are normally expanded. No infiltrates, consolidates or pleural effusions are seen. Heart has normal size, mediastinum appears unremarkable. There is no perihilar vascular congestion. No acute cardiopulmonary process. Objective:   Vitals:   Vitals:    08/24/22 0727   BP:    Pulse:    Resp:    Temp:    SpO2: 100%     Patient Vitals for the past 24 hrs:   BP Temp Temp src Pulse Resp SpO2 Height   08/24/22 0727 -- -- -- -- -- 100 % --   08/24/22 0521 137/62 98.9 °F (37.2 °C) Oral 68 18 99 % --   08/23/22 2314 107/62 99.1 °F (37.3 °C) Oral 76 16 100 % --   08/23/22 1645 135/82 98.2 °F (36.8 °C) Oral 66 16 98 % --   08/23/22 1230 132/87 98.2 °F (36.8 °C) Oral 78 18 98 % --   08/23/22 1200 138/70 -- -- 71 26 -- --   08/23/22 1100 -- -- -- 70 24 -- --   08/23/22 1022 -- -- -- -- -- -- 5' (1.524 m)   08/23/22 1000 -- -- -- 76 25 -- --   08/23/22 0900 -- -- -- 71 22 -- --     General appearance: alert, appears stated age and cooperative  Skin: Skin color, texture, turgor normal. No rashes or lesions  HEENT: Head: Normocephalic, no lesions, without obvious abnormality.   Neck: no adenopathy, no carotid bruit, no JVD, supple, symmetrical, trachea midline, and thyroid not enlarged, symmetric, no tenderness/mass/nodules  Lungs: CTA  Heart: regular rate and rhythm, S1, S2 normal, no murmur, click, rub or gallop  Abdomen: soft, non-tender; bowel sounds normal; no masses,  no organomegaly  Extremities: extremities normal, atraumatic, no cyanosis or edema  Neurologic: Mental status: Alert, oriented, thought content appropriate      Assessment/Plan:     1. Acute kidney injury. Acute kidney injury most likely secondary to  profound volume depletion (although she does not have low FENa or urea) - her renal function has improved with hydration with good urinary output suggests volume depletion to be the primary factor in the setting of use of ACOSTA-2 agents - RIGO now resolved - follow daily labs and strict intake and output - maintain IVF as NPO this am and then plan to stop     2. Hypokalemia. Hypokalemia secondary to obligatory potassium  losses with recovering renal function - continue to supplement potassium and magnesium as needed    3. Hypocalcemia. Hypocalcemia of undetermined etiology - she   does have elevated PTH levels with rather fair vitamin D levels -repeat PTH level 161 -serum calcium levels are stabilizing    4. Dehydration. now resolved after good response to initial aggressive IV hydration    5. Hypophosphatemia: Has received IV supplementation in the ICU setting on 8/23 - supplement again today for goal of > 2.5            Lesly Olszewski, APRN - CNP      Patient seen and examined. Chart reviewed. I had a face to face encounter with the patient. Agree with exam.    Agree with  formulation, assessment and plan as outlined above and directed by me. Addition and corrections were done as deemed appropriate. My exam and plan include:     Continue current treatment as outlined above. Metabolic acidosis-metabolic acidosis of undetermined etiology. We'll check serum ketones.   Initially patient did have alcohol overdoses which was probably sent to volume contraction.             Nasra Martínez MD  Nephrology        Electronically signed by Nasra Martínez MD on 8/24/2022 at 12:52 PM

## 2022-08-24 NOTE — PROGRESS NOTES
Pharmacy Consultation Note  (Antibiotic Dosing and Monitoring)    Initial consult date: 8/22/22  Consulting physician/provider: Dr. Fozia Heck  Drug: Vancomycin  Indication: Sepsis of unknown etiology    Age/  Gender Height Weight IBW  Allergy Information   42 y.o./female 5' (152.4 cm) 130 lb (59 kg)     Ideal body weight: 45.5 kg (100 lb 4.9 oz)  Adjusted ideal body weight: 46.9 kg (103 lb 4.9 oz)   Patient has no known allergies. Renal Function:  Recent Labs     08/23/22  0445 08/23/22  1620 08/24/22  0517   BUN 15 9 7   CREATININE 1.1* 0.9 0.8       Intake/Output Summary (Last 24 hours) at 8/24/2022 0819  Last data filed at 8/23/2022 1854  Gross per 24 hour   Intake 1620.66 ml   Output --   Net 1620.66 ml         Vancomycin Monitoring:  Trough:  No results for input(s): VANCOTROUGH in the last 72 hours. Random:    Recent Labs     08/23/22  0445   VANCORANDOM <4.0*     Recent vancomycin administrations                     vancomycin (VANCOCIN) 1,000 mg in sodium chloride 0.9 % 250 mL IVPB ([id number]) (mg) 1,000 mg New Bag 08/23/22 1018    vancomycin (VANCOCIN) 750 mg in dextrose 5 % 250 mL IVPB (mg) 750 mg New Bag 08/22/22 1013                     Assessment:  Patient is a 43 y.o. female who has been initiated on vancomycin  Estimated Creatinine Clearance: 66 mL/min (based on SCr of 0.8 mg/dL). To dose vancomycin, pharmacy will be utilizing dosing based off of levels because of patient's renal impairment/insufficiency  8/22: scr trending down 4.0 -> 1.9, baseline unknown. 8/23: scr 1.1, vanco level <4.0  8/24: scr 0.8    Plan:   Will continue vancomycin 1000 mg q18h  Will check vancomycin level 8/25 at 0730   Will continue to monitor renal function   Clinical pharmacy to follow      Kristi Alvarenga [address] [place] [address] 8/24/2022 8:19 AM
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
## 2022-08-24 NOTE — PROCEDURES
Procedure: Maisha Prose stress test     Ordering physician: Nelson Grissom  Referring physician:Dr.Keith Paz    Indication: Chest Pain, abnormal EKG    Pretest evaluation no chest pain, no shortness of breath    Resting EKG showed: Sinus rhythm     Protocol: Patient was given 0.4mg of Lexiscan followed by Cardiolite injection    Heart rate response:   Resting heart rate: 77 BPM   Stress heart rate: 112 BPM     Blood pressure response:   Resting blood pressure: 138/95 mmHg   Stress blood pressure: 150/92 mmHg     Symptoms and signs:feeling warm and nauseous     EKG changes:  Resting EKG: nonischemic   Stress EKG : nonischemic     Impression:   Clinical: nonischemic   EKG: nonischemic     Cardiolite injected and nuclear images are pending

## 2022-08-25 LAB
MYELOPEROXIDASE AB: 0 AU/ML (ref 0–19)
SERINE PROTEASE 3 AB: 4 AU/ML (ref 0–19)

## 2022-08-25 NOTE — DISCHARGE SUMMARY
57 and 66 respectively. The patient's CBC showed  hemoglobin and hematocrit of 12.7 and 38.6 respectively. White count  was normal.  Fungitell test was negative and further lab work was  normal.    The patient will be discharged back to [facility] with followup with Dr. Edith Menezes the doctor at that time, and nurse practitioner. Blood pressure  at the time of discharge 137/65, temperature 98.9, pulse 58,  respirations 16, O2 sat 100%, height 5 feet, and weight 107 pounds. The patient was discharged on Augmentin 875/125 mg one b.i.d. for five  days and vitamin D 23536 units weekly. The patient at the time of discharge was stable. Her prognosis at this  time is guarded. Considering followup for rehab, etc.    More than 40 minutes were spent on the day of discharge with more than  50% of the time spent face-to-face with the patient. She will be  discharged to [facility] by guard.         David Chance DO    D: 08/24/2022 16:39:37       T: 08/25/2022 2:35:34     [email or web address]  Job#: [id number]     Doc#: [id number]    CC:

## 2022-08-27 LAB
BLOOD CULTURE, ROUTINE: NORMAL
CULTURE, BLOOD 2: NORMAL

## 2022-09-21 PROBLEM — R77.8 ELEVATED TROPONIN: Status: RESOLVED | Noted: 2022-08-22 | Resolved: 2022-09-21

## 2022-09-21 PROBLEM — R79.89 ELEVATED TROPONIN: Status: RESOLVED | Noted: 2022-08-22 | Resolved: 2022-09-21

## 2023-04-25 ENCOUNTER — HOSPITAL ENCOUNTER (OUTPATIENT)
Age: 43
Discharge: HOME | End: 2023-04-25

## 2023-04-25 LAB
ALBUMIN SERPL-MCNC: 4.1 G/DL (ref 3.5–5.2)
ALP SERPL-CCNC: 81 U/L (ref 35–104)
ALT SERPL-CCNC: 47 U/L (ref 0–32)
ANION GAP SERPL CALCULATED.3IONS-SCNC: 13 MMOL/L (ref 7–16)
APTT BLD: 37.9 SEC (ref 24.5–35.1)
AST SERPL-CCNC: 32 U/L (ref 0–31)
BASOPHILS # BLD: 0.04 E9/L (ref 0–0.2)
BASOPHILS NFR BLD: 0.5 % (ref 0–2)
BILIRUB SERPL-MCNC: <0.2 MG/DL (ref 0–1.2)
BUN SERPL-MCNC: 13 MG/DL (ref 6–20)
CALCIUM SERPL-MCNC: 9.1 MG/DL (ref 8.6–10.2)
CHLORIDE SERPL-SCNC: 104 MMOL/L (ref 98–107)
CO2 SERPL-SCNC: 22 MMOL/L (ref 22–29)
CREAT SERPL-MCNC: 0.7 MG/DL (ref 0.5–1)
EOSINOPHIL # BLD: 0.11 E9/L (ref 0.05–0.5)
EOSINOPHIL NFR BLD: 1.4 % (ref 0–6)
ERYTHROCYTE [DISTWIDTH] IN BLOOD BY AUTOMATED COUNT: 12.4 FL (ref 11.5–15)
FERRITIN SERPL-MCNC: 20 NG/ML
GLUCOSE SERPL-MCNC: 101 MG/DL (ref 74–99)
HCT VFR BLD AUTO: 42.1 % (ref 34–48)
HGB BLD-MCNC: 13.7 G/DL (ref 11.5–15.5)
IMM GRANULOCYTES # BLD: 0.02 E9/L
IMM GRANULOCYTES NFR BLD: 0.3 % (ref 0–5)
INR BLD: 0.9
IRON SATN MFR SERPL: 15 % (ref 15–50)
IRON SERPL-MCNC: 51 MCG/DL (ref 37–145)
LYMPHOCYTES # BLD: 1.93 E9/L (ref 1.5–4)
LYMPHOCYTES NFR BLD: 24.4 % (ref 20–42)
MCH RBC QN AUTO: 31 PG (ref 26–35)
MCHC RBC AUTO-ENTMCNC: 32.5 % (ref 32–34.5)
MCV RBC AUTO: 95.2 FL (ref 80–99.9)
MONOCYTES # BLD: 0.62 E9/L (ref 0.1–0.95)
MONOCYTES NFR BLD: 7.8 % (ref 2–12)
NEUTROPHILS # BLD: 5.2 E9/L (ref 1.8–7.3)
NEUTS SEG NFR BLD: 65.6 % (ref 43–80)
PLATELET # BLD AUTO: 298 E9/L (ref 130–450)
PMV BLD AUTO: 10.6 FL (ref 7–12)
POTASSIUM SERPL-SCNC: 4.3 MMOL/L (ref 3.5–5)
PROT SERPL-MCNC: 7.2 G/DL (ref 6.4–8.3)
PROTHROMBIN TIME: 9.8 SEC (ref 9.3–12.4)
RBC # BLD AUTO: 4.42 E12/L (ref 3.5–5.5)
SODIUM SERPL-SCNC: 139 MMOL/L (ref 132–146)
TIBC SERPL-MCNC: 330 MCG/DL (ref 250–450)
WBC # BLD: 7.9 E9/L (ref 4.5–11.5)

## 2023-04-27 ENCOUNTER — HOSPITAL ENCOUNTER (INPATIENT)
Age: 43
LOS: 1 days | Discharge: HOME | DRG: 198 | End: 2023-04-28
Attending: EMERGENCY MEDICINE | Admitting: INTERNAL MEDICINE
Payer: COMMERCIAL

## 2023-04-27 ENCOUNTER — APPOINTMENT (OUTPATIENT)
Dept: GENERAL RADIOLOGY | Age: 43
DRG: 198 | End: 2023-04-27
Payer: COMMERCIAL

## 2023-04-27 DIAGNOSIS — I21.4 NSTEMI (NON-ST ELEVATED MYOCARDIAL INFARCTION) (HCC): Primary | ICD-10-CM

## 2023-04-27 PROBLEM — R07.9 CHEST PAIN: Status: ACTIVE | Noted: 2023-04-27

## 2023-04-27 LAB
AMPHET UR QL SCN: NOT DETECTED
ANION GAP SERPL CALCULATED.3IONS-SCNC: 10 MMOL/L (ref 7–16)
BARBITURATES UR QL SCN: NOT DETECTED
BASOPHILS # BLD: 0.05 E9/L (ref 0–0.2)
BASOPHILS NFR BLD: 0.5 % (ref 0–2)
BENZODIAZ UR QL SCN: NOT DETECTED
BUN SERPL-MCNC: 17 MG/DL (ref 6–20)
CALCIUM SERPL-MCNC: 9.7 MG/DL (ref 8.6–10.2)
CANNABINOIDS UR QL SCN: NOT DETECTED
CHLORIDE SERPL-SCNC: 104 MMOL/L (ref 98–107)
CO2 SERPL-SCNC: 22 MMOL/L (ref 22–29)
COCAINE UR QL SCN: NOT DETECTED
CREAT SERPL-MCNC: 0.6 MG/DL (ref 0.5–1)
D DIMER: <200 NG/ML DDU
DRUG SCREEN COMMENT UR-IMP: ABNORMAL
EOSINOPHIL # BLD: 0.11 E9/L (ref 0.05–0.5)
EOSINOPHIL NFR BLD: 1.2 % (ref 0–6)
ERYTHROCYTE [DISTWIDTH] IN BLOOD BY AUTOMATED COUNT: 12.4 FL (ref 11.5–15)
FENTANYL SCREEN, URINE: POSITIVE
GLUCOSE SERPL-MCNC: 98 MG/DL (ref 74–99)
HBV CORE AB SER QL: NONREACTIVE
HBV SURFACE AB SERPL IA-ACNC: REACTIVE M[IU]/ML
HCT VFR BLD AUTO: 41.8 % (ref 34–48)
HCV RNA SERPL NAA+PROBE-ACNC: ABNORMAL IU/ML
HCV RNA SERPL NAA+PROBE-LOG IU: 5.49 LOG IU/ML
HCV RNA SERPL QL NAA+PROBE: DETECTED
HGB BLD-MCNC: 14 G/DL (ref 11.5–15.5)
HIV1+2 AB SERPL QL IA: NORMAL
IMM GRANULOCYTES # BLD: 0.02 E9/L
IMM GRANULOCYTES NFR BLD: 0.2 % (ref 0–5)
LYMPHOCYTES # BLD: 2.06 E9/L (ref 1.5–4)
LYMPHOCYTES NFR BLD: 21.5 % (ref 20–42)
MCH RBC QN AUTO: 31.6 PG (ref 26–35)
MCHC RBC AUTO-ENTMCNC: 33.5 % (ref 32–34.5)
MCV RBC AUTO: 94.4 FL (ref 80–99.9)
METHADONE UR QL SCN: NOT DETECTED
MONOCYTES # BLD: 0.81 E9/L (ref 0.1–0.95)
MONOCYTES NFR BLD: 8.5 % (ref 2–12)
NEUTROPHILS # BLD: 6.51 E9/L (ref 1.8–7.3)
NEUTS SEG NFR BLD: 68.1 % (ref 43–80)
OPIATES UR QL SCN: NOT DETECTED
OXYCODONE URINE: NOT DETECTED
PCP UR QL SCN: NOT DETECTED
PLATELET # BLD AUTO: 320 E9/L (ref 130–450)
PMV BLD AUTO: 10.4 FL (ref 7–12)
POTASSIUM SERPL-SCNC: 3.7 MMOL/L (ref 3.5–5)
RBC # BLD AUTO: 4.43 E12/L (ref 3.5–5.5)
SODIUM SERPL-SCNC: 136 MMOL/L (ref 132–146)
TROPONIN, HIGH SENSITIVITY: 17 NG/L (ref 0–9)
TROPONIN, HIGH SENSITIVITY: 34 NG/L (ref 0–9)
WBC # BLD: 9.6 E9/L (ref 4.5–11.5)

## 2023-04-27 PROCEDURE — 1200000000 HC SEMI PRIVATE

## 2023-04-27 PROCEDURE — 93005 ELECTROCARDIOGRAM TRACING: CPT

## 2023-04-27 PROCEDURE — 36415 COLL VENOUS BLD VENIPUNCTURE: CPT

## 2023-04-27 PROCEDURE — 71046 X-RAY EXAM CHEST 2 VIEWS: CPT

## 2023-04-27 PROCEDURE — 80048 BASIC METABOLIC PNL TOTAL CA: CPT

## 2023-04-27 PROCEDURE — 85378 FIBRIN DEGRADE SEMIQUANT: CPT

## 2023-04-27 PROCEDURE — 84484 ASSAY OF TROPONIN QUANT: CPT

## 2023-04-27 PROCEDURE — 80307 DRUG TEST PRSMV CHEM ANLYZR: CPT

## 2023-04-27 PROCEDURE — 99285 EMERGENCY DEPT VISIT HI MDM: CPT

## 2023-04-27 PROCEDURE — 85025 COMPLETE CBC W/AUTO DIFF WBC: CPT

## 2023-04-27 RX ORDER — CLOPIDOGREL 300 MG/1
300 TABLET, FILM COATED ORAL ONCE
Status: DISCONTINUED | OUTPATIENT
Start: 2023-04-27 | End: 2023-04-27

## 2023-04-27 RX ORDER — ASPIRIN 81 MG/1
324 TABLET, CHEWABLE ORAL ONCE
Status: COMPLETED | OUTPATIENT
Start: 2023-04-27 | End: 2023-04-28

## 2023-04-27 RX ORDER — 0.9 % SODIUM CHLORIDE 0.9 %
1000 INTRAVENOUS SOLUTION INTRAVENOUS ONCE
Status: COMPLETED | OUTPATIENT
Start: 2023-04-28 | End: 2023-04-28

## 2023-04-27 RX ORDER — NITROGLYCERIN 0.4 MG/1
0.4 TABLET SUBLINGUAL EVERY 5 MIN PRN
Status: COMPLETED | OUTPATIENT
Start: 2023-04-27 | End: 2023-04-28

## 2023-04-27 ASSESSMENT — PAIN DESCRIPTION - LOCATION: LOCATION: CHEST

## 2023-04-27 ASSESSMENT — PAIN DESCRIPTION - DESCRIPTORS: DESCRIPTORS: SHARP

## 2023-04-27 ASSESSMENT — PAIN DESCRIPTION - ORIENTATION: ORIENTATION: MID

## 2023-04-27 ASSESSMENT — PAIN SCALES - GENERAL: PAINLEVEL_OUTOF10: 8

## 2023-04-27 ASSESSMENT — PAIN - FUNCTIONAL ASSESSMENT: PAIN_FUNCTIONAL_ASSESSMENT: 0-10

## 2023-04-27 ASSESSMENT — LIFESTYLE VARIABLES
HOW OFTEN DO YOU HAVE A DRINK CONTAINING ALCOHOL: NEVER
HOW MANY STANDARD DRINKS CONTAINING ALCOHOL DO YOU HAVE ON A TYPICAL DAY: PATIENT DOES NOT DRINK

## 2023-04-28 VITALS
SYSTOLIC BLOOD PRESSURE: 117 MMHG | TEMPERATURE: 98.3 F | RESPIRATION RATE: 18 BRPM | BODY MASS INDEX: 21.66 KG/M2 | HEART RATE: 58 BPM | WEIGHT: 130 LBS | DIASTOLIC BLOOD PRESSURE: 60 MMHG | OXYGEN SATURATION: 100 % | HEIGHT: 65 IN

## 2023-04-28 PROBLEM — I21.4 NSTEMI (NON-ST ELEVATED MYOCARDIAL INFARCTION) (HCC): Status: ACTIVE | Noted: 2023-04-28

## 2023-04-28 LAB
ALBUMIN SERPL-MCNC: 3.6 G/DL (ref 3.5–5.2)
ALP SERPL-CCNC: 68 U/L (ref 35–104)
ALT SERPL-CCNC: 45 U/L (ref 0–32)
ANION GAP SERPL CALCULATED.3IONS-SCNC: 10 MMOL/L (ref 7–16)
AST SERPL-CCNC: 33 U/L (ref 0–31)
BASOPHILS # BLD: 0.04 E9/L (ref 0–0.2)
BASOPHILS NFR BLD: 0.5 % (ref 0–2)
BILIRUB SERPL-MCNC: 0.2 MG/DL (ref 0–1.2)
BUN SERPL-MCNC: 13 MG/DL (ref 6–20)
CALCIUM SERPL-MCNC: 8.5 MG/DL (ref 8.6–10.2)
CHLORIDE SERPL-SCNC: 106 MMOL/L (ref 98–107)
CHOLESTEROL, TOTAL: 198 MG/DL (ref 0–199)
CO2 SERPL-SCNC: 19 MMOL/L (ref 22–29)
CREAT SERPL-MCNC: 0.5 MG/DL (ref 0.5–1)
EKG ATRIAL RATE: 82 BPM
EKG P AXIS: 38 DEGREES
EKG P-R INTERVAL: 126 MS
EKG Q-T INTERVAL: 368 MS
EKG QRS DURATION: 78 MS
EKG QTC CALCULATION (BAZETT): 429 MS
EKG R AXIS: 65 DEGREES
EKG T AXIS: 40 DEGREES
EKG VENTRICULAR RATE: 82 BPM
EOSINOPHIL # BLD: 0.15 E9/L (ref 0.05–0.5)
EOSINOPHIL NFR BLD: 1.9 % (ref 0–6)
ERYTHROCYTE [DISTWIDTH] IN BLOOD BY AUTOMATED COUNT: 12.6 FL (ref 11.5–15)
GLUCOSE SERPL-MCNC: 83 MG/DL (ref 74–99)
HCT VFR BLD AUTO: 40.6 % (ref 34–48)
HDLC SERPL-MCNC: 66 MG/DL
HGB BLD-MCNC: 13.2 G/DL (ref 11.5–15.5)
IMM GRANULOCYTES # BLD: 0.02 E9/L
IMM GRANULOCYTES NFR BLD: 0.3 % (ref 0–5)
LDLC SERPL CALC-MCNC: 111 MG/DL (ref 0–99)
LYMPHOCYTES # BLD: 2.4 E9/L (ref 1.5–4)
LYMPHOCYTES NFR BLD: 30.7 % (ref 20–42)
Lab: NORMAL
MAGNESIUM SERPL-MCNC: 1.7 MG/DL (ref 1.6–2.6)
MCH RBC QN AUTO: 31 PG (ref 26–35)
MCHC RBC AUTO-ENTMCNC: 32.5 % (ref 32–34.5)
MCV RBC AUTO: 95.3 FL (ref 80–99.9)
MONOCYTES # BLD: 0.69 E9/L (ref 0.1–0.95)
MONOCYTES NFR BLD: 8.8 % (ref 2–12)
NEUTROPHILS # BLD: 4.51 E9/L (ref 1.8–7.3)
NEUTS SEG NFR BLD: 57.8 % (ref 43–80)
PHOSPHATE SERPL-MCNC: 3 MG/DL (ref 2.5–4.5)
PLATELET # BLD AUTO: 277 E9/L (ref 130–450)
PMV BLD AUTO: 10.8 FL (ref 7–12)
POTASSIUM SERPL-SCNC: 3.7 MMOL/L (ref 3.5–5)
PROCALCITONIN: 0.04 NG/ML (ref 0–0.08)
PROT SERPL-MCNC: 6.5 G/DL (ref 6.4–8.3)
RBC # BLD AUTO: 4.26 E12/L (ref 3.5–5.5)
REPORT: NORMAL
SODIUM SERPL-SCNC: 135 MMOL/L (ref 132–146)
T4 FREE SERPL-MCNC: 1.09 NG/DL (ref 0.93–1.7)
THIS TEST SENT TO: NORMAL
TRIGL SERPL-MCNC: 106 MG/DL (ref 0–149)
TROPONIN, HIGH SENSITIVITY: 44 NG/L (ref 0–9)
TSH SERPL-MCNC: 1.61 UIU/ML (ref 0.27–4.2)
VLDLC SERPL CALC-MCNC: 21 MG/DL
WBC # BLD: 7.8 E9/L (ref 4.5–11.5)

## 2023-04-28 PROCEDURE — 84484 ASSAY OF TROPONIN QUANT: CPT

## 2023-04-28 PROCEDURE — 83735 ASSAY OF MAGNESIUM: CPT

## 2023-04-28 PROCEDURE — 85025 COMPLETE CBC W/AUTO DIFF WBC: CPT

## 2023-04-28 PROCEDURE — 2580000003 HC RX 258: Performed by: INTERNAL MEDICINE

## 2023-04-28 PROCEDURE — 6360000002 HC RX W HCPCS: Performed by: INTERNAL MEDICINE

## 2023-04-28 PROCEDURE — 6370000000 HC RX 637 (ALT 250 FOR IP): Performed by: INTERNAL MEDICINE

## 2023-04-28 PROCEDURE — 84145 PROCALCITONIN (PCT): CPT

## 2023-04-28 PROCEDURE — 80053 COMPREHEN METABOLIC PANEL: CPT

## 2023-04-28 PROCEDURE — 99222 1ST HOSP IP/OBS MODERATE 55: CPT | Performed by: INTERNAL MEDICINE

## 2023-04-28 PROCEDURE — 93010 ELECTROCARDIOGRAM REPORT: CPT | Performed by: INTERNAL MEDICINE

## 2023-04-28 PROCEDURE — 84100 ASSAY OF PHOSPHORUS: CPT

## 2023-04-28 PROCEDURE — 84443 ASSAY THYROID STIM HORMONE: CPT

## 2023-04-28 PROCEDURE — 80061 LIPID PANEL: CPT

## 2023-04-28 PROCEDURE — 36415 COLL VENOUS BLD VENIPUNCTURE: CPT

## 2023-04-28 PROCEDURE — 6370000000 HC RX 637 (ALT 250 FOR IP)

## 2023-04-28 PROCEDURE — 84439 ASSAY OF FREE THYROXINE: CPT

## 2023-04-28 RX ORDER — ASPIRIN 81 MG/1
81 TABLET ORAL DAILY
Qty: 30 TABLET | Refills: 3 | Status: SHIPPED | OUTPATIENT
Start: 2023-04-29

## 2023-04-28 RX ORDER — DULOXETIN HYDROCHLORIDE 30 MG/1
30 CAPSULE, DELAYED RELEASE ORAL DAILY
Status: DISCONTINUED | OUTPATIENT
Start: 2023-04-28 | End: 2023-04-28 | Stop reason: HOSPADM

## 2023-04-28 RX ORDER — ASPIRIN 81 MG/1
81 TABLET ORAL DAILY
Status: DISCONTINUED | OUTPATIENT
Start: 2023-04-29 | End: 2023-04-28 | Stop reason: HOSPADM

## 2023-04-28 RX ORDER — SODIUM CHLORIDE 0.9 % (FLUSH) 0.9 %
5-40 SYRINGE (ML) INJECTION PRN
Status: DISCONTINUED | OUTPATIENT
Start: 2023-04-28 | End: 2023-04-28 | Stop reason: HOSPADM

## 2023-04-28 RX ORDER — ENOXAPARIN SODIUM 100 MG/ML
1 INJECTION SUBCUTANEOUS 2 TIMES DAILY
Status: DISCONTINUED | OUTPATIENT
Start: 2023-04-28 | End: 2023-04-28 | Stop reason: HOSPADM

## 2023-04-28 RX ORDER — ATORVASTATIN CALCIUM 40 MG/1
40 TABLET, FILM COATED ORAL NIGHTLY
Status: DISCONTINUED | OUTPATIENT
Start: 2023-04-28 | End: 2023-04-28 | Stop reason: HOSPADM

## 2023-04-28 RX ORDER — POLYETHYLENE GLYCOL 3350 17 G/17G
17 POWDER, FOR SOLUTION ORAL DAILY PRN
Status: DISCONTINUED | OUTPATIENT
Start: 2023-04-28 | End: 2023-04-28 | Stop reason: HOSPADM

## 2023-04-28 RX ORDER — BUSPIRONE HYDROCHLORIDE 10 MG/1
10 TABLET ORAL 2 TIMES DAILY
Status: DISCONTINUED | OUTPATIENT
Start: 2023-04-28 | End: 2023-04-28 | Stop reason: HOSPADM

## 2023-04-28 RX ORDER — TRAZODONE HYDROCHLORIDE 50 MG/1
50 TABLET ORAL NIGHTLY
COMMUNITY

## 2023-04-28 RX ORDER — ACETAMINOPHEN 650 MG/1
650 SUPPOSITORY RECTAL EVERY 6 HOURS PRN
Status: DISCONTINUED | OUTPATIENT
Start: 2023-04-28 | End: 2023-04-28 | Stop reason: HOSPADM

## 2023-04-28 RX ORDER — LOSARTAN POTASSIUM 100 MG/1
100 TABLET ORAL DAILY
COMMUNITY

## 2023-04-28 RX ORDER — LOSARTAN POTASSIUM 100 MG/1
100 TABLET ORAL DAILY
Status: DISCONTINUED | OUTPATIENT
Start: 2023-04-28 | End: 2023-04-28 | Stop reason: HOSPADM

## 2023-04-28 RX ORDER — DULOXETIN HYDROCHLORIDE 30 MG/1
30 CAPSULE, DELAYED RELEASE ORAL DAILY
COMMUNITY

## 2023-04-28 RX ORDER — ACETAMINOPHEN 325 MG/1
650 TABLET ORAL EVERY 6 HOURS PRN
Status: DISCONTINUED | OUTPATIENT
Start: 2023-04-28 | End: 2023-04-28 | Stop reason: HOSPADM

## 2023-04-28 RX ORDER — ENOXAPARIN SODIUM 100 MG/ML
40 INJECTION SUBCUTANEOUS DAILY
Status: DISCONTINUED | OUTPATIENT
Start: 2023-04-28 | End: 2023-04-28

## 2023-04-28 RX ORDER — SODIUM CHLORIDE 0.9 % (FLUSH) 0.9 %
5-40 SYRINGE (ML) INJECTION EVERY 12 HOURS SCHEDULED
Status: DISCONTINUED | OUTPATIENT
Start: 2023-04-28 | End: 2023-04-28 | Stop reason: HOSPADM

## 2023-04-28 RX ORDER — BUSPIRONE HYDROCHLORIDE 10 MG/1
10 TABLET ORAL 2 TIMES DAILY
COMMUNITY
Start: 2023-04-28

## 2023-04-28 RX ORDER — ATORVASTATIN CALCIUM 40 MG/1
40 TABLET, FILM COATED ORAL NIGHTLY
Qty: 30 TABLET | Refills: 3 | Status: SHIPPED | OUTPATIENT
Start: 2023-04-28

## 2023-04-28 RX ORDER — DEXTROSE MONOHYDRATE 100 MG/ML
INJECTION, SOLUTION INTRAVENOUS CONTINUOUS PRN
Status: DISCONTINUED | OUTPATIENT
Start: 2023-04-28 | End: 2023-04-28 | Stop reason: HOSPADM

## 2023-04-28 RX ORDER — SODIUM CHLORIDE 9 MG/ML
INJECTION, SOLUTION INTRAVENOUS PRN
Status: DISCONTINUED | OUTPATIENT
Start: 2023-04-28 | End: 2023-04-28 | Stop reason: HOSPADM

## 2023-04-28 RX ORDER — BUSPIRONE HYDROCHLORIDE 10 MG/1
100 TABLET ORAL 2 TIMES DAILY
Status: ON HOLD | COMMUNITY
End: 2023-04-28 | Stop reason: HOSPADM

## 2023-04-28 RX ADMIN — SODIUM CHLORIDE, PRESERVATIVE FREE 10 ML: 5 INJECTION INTRAVENOUS at 08:22

## 2023-04-28 RX ADMIN — ENOXAPARIN SODIUM 60 MG: 100 INJECTION SUBCUTANEOUS at 01:42

## 2023-04-28 RX ADMIN — LOSARTAN POTASSIUM 100 MG: 100 TABLET, FILM COATED ORAL at 08:21

## 2023-04-28 RX ADMIN — ENOXAPARIN SODIUM 60 MG: 100 INJECTION SUBCUTANEOUS at 13:22

## 2023-04-28 RX ADMIN — BUSPIRONE HYDROCHLORIDE 10 MG: 10 TABLET ORAL at 08:21

## 2023-04-28 RX ADMIN — ASPIRIN 81 MG 324 MG: 81 TABLET ORAL at 00:10

## 2023-04-28 RX ADMIN — NITROGLYCERIN 0.4 MG: 0.4 TABLET, ORALLY DISINTEGRATING SUBLINGUAL at 01:07

## 2023-04-28 RX ADMIN — NITROGLYCERIN 0.4 MG: 0.4 TABLET, ORALLY DISINTEGRATING SUBLINGUAL at 00:45

## 2023-04-28 RX ADMIN — DULOXETINE HYDROCHLORIDE 30 MG: 30 CAPSULE, DELAYED RELEASE ORAL at 08:21

## 2023-04-28 RX ADMIN — NITROGLYCERIN 0.4 MG: 0.4 TABLET, ORALLY DISINTEGRATING SUBLINGUAL at 00:54

## 2023-04-28 RX ADMIN — SODIUM CHLORIDE 1000 ML: 9 INJECTION, SOLUTION INTRAVENOUS at 00:11

## 2023-04-28 ASSESSMENT — PAIN SCALES - GENERAL
PAINLEVEL_OUTOF10: 4
PAINLEVEL_OUTOF10: 6
PAINLEVEL_OUTOF10: 4
PAINLEVEL_OUTOF10: 0

## 2023-04-28 ASSESSMENT — PAIN DESCRIPTION - ORIENTATION
ORIENTATION: MID

## 2023-04-28 ASSESSMENT — PAIN DESCRIPTION - DESCRIPTORS
DESCRIPTORS: SHARP

## 2023-04-28 ASSESSMENT — PAIN DESCRIPTION - LOCATION
LOCATION: CHEST

## 2023-04-28 NOTE — H&P
Department of Internal Medicine  History and Physical    PCP: Mike Roblero MD  Admitting Physician: Dr. Milan Burrell  Consultants:   Date of Service: 2023    CHIEF COMPLAINT:  chest pain     HISTORY OF PRESENT ILLNESS:    Patient is a 70-year-old female who presented to the ED due to chest pain patient describes it as sharp across her chest.  It is worse with deep inhalation and palpation. Palpation actually reproduces the pain. However she cannot pain at rest.  She describes additional shortness of breath. She admits to feeling wheezy and lightheaded at times. She denies any fever or chills. She denies any nausea or emesis. PAST MEDICAL Hx:  Past Medical History:   Diagnosis Date    Acute kidney injury ([facility] 2022    H/O cardiovascular stress test 2022    Lexiscan    Hyperlipemia 2014    Substance abuse ([facility] [address]     Varicose vein        PAST SURGICAL Hx:   Past Surgical History:   Procedure Laterality Date    TUBAL LIGATION  2013       FAMILY Hx:  Family History   Problem Relation Age of Onset    Heart Disease Father          of heart attack at age 28    Diabetes Paternal Grandmother     Diabetes Other         Aunt    Diabetes Other         Uncle    COPD Paternal Grandfather        HOME MEDICATIONS:  Prior to Admission medications    Medication Sig Start Date End Date Taking?  Authorizing Provider   busPIRone (BUSPAR) 10 MG tablet Take 10 tablets by mouth in the morning and at bedtime   Yes Historical Provider, MD   DULoxetine (CYMBALTA) 30 MG extended release capsule Take 1 capsule by mouth daily   Yes Historical Provider, MD   losartan (COZAAR) 100 MG tablet Take 1 tablet by mouth daily   Yes Historical Provider, MD   traZODone (DESYREL) 50 MG tablet Take 1 tablet by mouth nightly   Yes Historical Provider, MD   vitamin D (ERGOCALCIFEROL) 1.25 MG (18377 UT) CAPS capsule Take 1 capsule by mouth once a week 22   Blayne Henson DO       ALLERGIES:  Patient has no known

## 2023-04-28 NOTE — CARE COORDINATION
Case Management Assessment  Initial Evaluation    Date/Time of Evaluation: 4/28/2023 11:39 AM  Assessment Completed by: Ginna Bourgeois RN    If patient is discharged prior to next notation, then this note serves as note for discharge by case management. Patient Name: Amy Bernard                   YOB: 1980  Diagnosis: Chest pain [R07.9]  NSTEMI (non-ST elevated myocardial infarction) [facility]) [I21.4]                   Date / Time: 4/27/2023  7:14 PM    Patient Admission Status: Inpatient   Readmission Risk (Low < 19, Mod (19-27), High > 27): Readmission Risk Score: 8.6    Current PCP: Jama Rod MD  PCP verified by CM? Yes    Chart Reviewed: Yes      History Provided by: Patient  Patient Orientation: Alert and Oriented    Patient Cognition: Alert    Hospitalization in the last 30 days (Readmission):  No    If yes, Readmission Assessment in CM Navigator will be completed. Advance Directives:      Code Status: Full Code   Patient's Primary Decision Maker is: Patient Declined (Legal Next of Kin Remains as Decision Maker) (only has boyfriend , pt declind having anyone be decision maker.)      Discharge Planning:    Patient lives with: Parent Type of Home: House  Primary Care Giver: Self  Patient Support Systems include: Spouse/Significant Other, Family Members     ADLS  Prior functional level: Independent in ADLs/IADLs  Current functional level: Independent in ADLs/IADLs      Family can provide assistance at DC: Yes  Would you like Case Management to discuss the discharge plan with any other family members/significant others, and if so, who?  No  Plans to Return to Present Housing: Yes  Other Identified Issues/Barriers to RETURNING to current housing: none   Potential Assistance needed at discharge: N/A             Financial    Payor: Caren Rank / Plan: Lysle Bone / Product Type: *No Product type* /       Gerhardt Rings [phone] [address]

## 2023-04-28 NOTE — PROGRESS NOTES
CLINICAL PHARMACY NOTE: MEDS TO BEDS    Total # of Prescriptions Filled: 2   The following medications were delivered to the patient:  Atorvastatin 40 mg  Aspirin 81 mg    Additional Documentation:

## 2023-04-28 NOTE — PROGRESS NOTES
Patient's home medications updated via list provided by patient. Patient has pill reminder with medications in it. This was collected and placed in patient bin in locked med room.  Electronically signed by Evi Hawley RN on 4/28/2023 at 1:29 AM

## 2023-04-28 NOTE — ED PROVIDER NOTES
all other systems reviewed and are negative.    --------------------------------------------- PAST HISTORY ---------------------------------------------  Past Medical History:  has a past medical history of Acute kidney injury ([facility] H/O cardiovascular stress test, Hyperlipemia, Sub[facility] ([facility] and Varicose vein. Past Surgical History:  has a past surgical history that includes Tubal ligation (2013 March). Social History:  reports that she has been smoking cigarettes. She has a 14.00 pack-year smoking history. She uses smokeless tobacco. She reports that she does not currently use drugs. She reports that she does not drink alcohol. Family History: family history includes COPD in her paternal grandfather; Diabetes in her paternal grandmother and other family members; Heart Disease in her father. The patients home medications have been reviewed. Allergies: Patient has no known allergies. --------------------------------------------------------------------------------------------------------------  Nursing notes and vital signs reviewed          On My Exam:    Constitutional/General: Alert and oriented x3  Head: Normocephalic and atraumatic  Eyes: PERRL, EOMI, conjunctiva normal, sclera non icteric  Mouth: Oropharynx clear, handling secretions, no trismus, no asymmetry of the posterior oropharynx or uvular edema  Neck: Supple, full ROM, no stridor, no meningeal signs  Respiratory: Lungs clear to auscultation bilaterally, no wheezes, rales, or rhonchi. Not in respiratory distress  Cardiovascular:  Regular rate. Regular rhythm. No murmurs, no aortic murmurs, no gallops, or rubs. 2+ distal pulses. Equal extremity pulses. Chest: No chest wall tenderness  GI:  Abdomen Soft, Non tender, Non distended. +BS. No rebound, guarding, or rigidity. No pulsatile masses. Musculoskeletal: Moves all extremities x 4. Warm and well perfused, no clubbing, cyanosis, or edema.  Capillary refill <3
alcohol. Family History: family history includes COPD in her paternal grandfather; Diabetes in her paternal grandmother and other family members; Heart Disease in her father. The patients home medications have been reviewed. Allergies: Patient has no known allergies.     -------------------------------------------------- RESULTS -------------------------------------------------    LABS:  Results for orders placed or performed during the hospital encounter of 04/27/23   CBC with Auto Differential   Result Value Ref Range    WBC 9.6 4.5 - 11.5 E9/L    RBC 4.43 3.50 - 5.50 E12/L    Hemoglobin 14.0 11.5 - 15.5 g/dL    Hematocrit 41.8 34.0 - 48.0 %    MCV 94.4 80.0 - 99.9 fL    MCH 31.6 26.0 - 35.0 pg    MCHC 33.5 32.0 - 34.5 %    RDW 12.4 11.5 - 15.0 fL    Platelets 877 828 - 269 E9/L    MPV 10.4 7.0 - 12.0 fL    Neutrophils % 68.1 43.0 - 80.0 %    Immature Granulocytes % 0.2 0.0 - 5.0 %    Lymphocytes % 21.5 20.0 - 42.0 %    Monocytes % 8.5 2.0 - 12.0 %    Eosinophils % 1.2 0.0 - 6.0 %    Basophils % 0.5 0.0 - 2.0 %    Neutrophils Absolute 6.51 1.80 - 7.30 E9/L    Immature Granulocytes # 0.02 E9/L    Lymphocytes Absolute 2.06 1.50 - 4.00 E9/L    Monocytes Absolute 0.81 0.10 - 0.95 E9/L    Eosinophils Absolute 0.11 0.05 - 0.50 E9/L    Basophils Absolute 0.05 0.00 - 0.20 W9/N   Basic Metabolic Panel w/ Reflex to MG   Result Value Ref Range    Sodium 136 132 - 146 mmol/L    Potassium reflex Magnesium 3.7 3.5 - 5.0 mmol/L    Chloride 104 98 - 107 mmol/L    CO2 22 22 - 29 mmol/L    Anion Gap 10 7 - 16 mmol/L    Glucose 98 74 - 99 mg/dL    BUN 17 6 - 20 mg/dL    Creatinine 0.6 0.5 - 1.0 mg/dL    Est, Glom Filt Rate >60 >=60 mL/min/1.73    Calcium 9.7 8.6 - 10.2 mg/dL   Troponin   Result Value Ref Range    Troponin, High Sensitivity 17 (H) 0 - 9 ng/L   URINE DRUG SCREEN   Result Value Ref Range    Amphetamine Screen, Urine NOT DETECTED Negative <1000 ng/mL    Barbiturate Screen, Ur NOT DETECTED Negative < 200 ng/mL

## 2023-04-28 NOTE — CONSULTS
is normal.     CBC:   Lab Results   Component Value Date/Time    WBC 7.8 04/28/2023 05:50 AM    RBC 4.26 04/28/2023 05:50 AM    HGB 13.2 04/28/2023 05:50 AM    HCT 40.6 04/28/2023 05:50 AM    MCV 95.3 04/28/2023 05:50 AM    MCH 31.0 04/28/2023 05:50 AM    MCHC 32.5 04/28/2023 05:50 AM    RDW 12.6 04/28/2023 05:50 AM     04/28/2023 05:50 AM    MPV 10.8 04/28/2023 05:50 AM     BMP:   Lab Results   Component Value Date/Time     04/28/2023 05:50 AM    K 3.7 04/28/2023 05:50 AM    K 3.7 04/27/2023 07:40 PM     04/28/2023 05:50 AM    CO2 19 04/28/2023 05:50 AM    BUN 13 04/28/2023 05:50 AM    LABALBU 3.6 04/28/2023 05:50 AM    CREATININE 0.5 04/28/2023 05:50 AM    CALCIUM 8.5 04/28/2023 05:50 AM    GFRAA >60 08/24/2022 05:17 AM    LABGLOM >60 04/28/2023 05:50 AM     Magnesium:    Lab Results   Component Value Date/Time    MG 1.7 04/28/2023 05:50 AM     Cardiac Enzymes:   Lab Results   Component Value Date    CKTOTAL 64 08/21/2022    TROPHS 44 (H) 04/28/2023    TROPHS 34 (H) 04/27/2023    TROPHS 17 (H) 04/27/2023      PT/INR:    Lab Results   Component Value Date/Time    PROTIME 9.8 04/25/2023 10:01 AM    INR 0.9 04/25/2023 10:01 AM     TSH:    Lab Results   Component Value Date/Time    TSH 1.610 04/28/2023 05:50 AM     Rhythm Strip: normal sinus rhythm. EKG:  normal sinus rhythm, nonspecific ST and T waves changes. Echo Summary 8/22/2022: No previous echo for comparison. Technically adequate study. Left ventricle size is normal.   Normal left ventricular wall thickness. Ejection fraction is visually estimated at 55%. No regional wall motion abnormalities seen. E/A flow reversal noted. Suggestive of diastolic dysfunction. Physiologic and/or trace tricuspid regurgitation. RVSP is normal.    Stress Summary 8/24/2022:  Gated SPECT left ventricular ejection fraction was calculated to be 68% with the normal wall motion and thickening. TID ratio 1.47   IMPRESSION:  1.   ECG during the

## 2023-04-29 NOTE — DISCHARGE SUMMARY
Dictate [id number]
car with behavior  modification being suggested at this time. More than 40 minutes were  spent on the day of discharge. More than 50% of the time was spent  face-to-face with the patient discussing plan of care and treatment.         Elia Tubbs DO    D: 04/28/2023 14:26:39       T: 04/28/2023 14:29:11     [email or web address]  Job#: [id number]     Doc#: [id number]    CC:

## 2023-05-05 ENCOUNTER — TRANSCRIBE ORDERS (OUTPATIENT)
Dept: ADMINISTRATIVE | Age: 43
End: 2023-05-05

## 2023-05-05 DIAGNOSIS — B18.2 CHRONIC HEPATITIS C WITH HEPATIC COMA (HCC): Primary | ICD-10-CM

## 2023-07-12 ENCOUNTER — HOSPITAL ENCOUNTER (EMERGENCY)
Age: 43
Discharge: HOME | End: 2023-07-12
Payer: COMMERCIAL

## 2023-07-12 VITALS
SYSTOLIC BLOOD PRESSURE: 147 MMHG | OXYGEN SATURATION: 97 % | RESPIRATION RATE: 16 BRPM | HEART RATE: 81 BPM | TEMPERATURE: 98.8 F | DIASTOLIC BLOOD PRESSURE: 85 MMHG

## 2023-07-12 DIAGNOSIS — I89.1 LYMPHANGITIS OF UPPER EXTREMITY: ICD-10-CM

## 2023-07-12 DIAGNOSIS — T23.221A SECOND DEGREE BURN OF FINGER OF RIGHT HAND, INITIAL ENCOUNTER: Primary | ICD-10-CM

## 2023-07-12 LAB
BASOPHILS # BLD: 0.05 E9/L (ref 0–0.2)
BASOPHILS NFR BLD: 0.4 % (ref 0–2)
CHP ED QC CHECK: YES
EOSINOPHIL # BLD: 0.18 E9/L (ref 0.05–0.5)
EOSINOPHIL NFR BLD: 1.6 % (ref 0–6)
ERYTHROCYTE [DISTWIDTH] IN BLOOD BY AUTOMATED COUNT: 11.9 FL (ref 11.5–15)
GLUCOSE BLD-MCNC: 105 MG/DL
HCT VFR BLD AUTO: 42.1 % (ref 34–48)
HGB BLD-MCNC: 14 G/DL (ref 11.5–15.5)
IMM GRANULOCYTES # BLD: 0.04 E9/L
IMM GRANULOCYTES NFR BLD: 0.4 % (ref 0–5)
LYMPHOCYTES # BLD: 2.28 E9/L (ref 1.5–4)
LYMPHOCYTES NFR BLD: 20.1 % (ref 20–42)
MCH RBC QN AUTO: 31.5 PG (ref 26–35)
MCHC RBC AUTO-ENTMCNC: 33.3 % (ref 32–34.5)
MCV RBC AUTO: 94.6 FL (ref 80–99.9)
METER GLUCOSE: 105 MG/DL (ref 74–99)
MONOCYTES # BLD: 1.12 E9/L (ref 0.1–0.95)
MONOCYTES NFR BLD: 9.9 % (ref 2–12)
NEUTROPHILS # BLD: 7.68 E9/L (ref 1.8–7.3)
NEUTS SEG NFR BLD: 67.6 % (ref 43–80)
PLATELET # BLD AUTO: 257 E9/L (ref 130–450)
PMV BLD AUTO: 10.6 FL (ref 7–12)
RBC # BLD AUTO: 4.45 E12/L (ref 3.5–5.5)
REASON FOR REJECTION: NORMAL
REJECTED TEST: NORMAL
WBC # BLD: 11.4 E9/L (ref 4.5–11.5)

## 2023-07-12 PROCEDURE — 85025 COMPLETE CBC W/AUTO DIFF WBC: CPT

## 2023-07-12 PROCEDURE — 36415 COLL VENOUS BLD VENIPUNCTURE: CPT

## 2023-07-12 PROCEDURE — 82962 GLUCOSE BLOOD TEST: CPT

## 2023-07-12 PROCEDURE — 99283 EMERGENCY DEPT VISIT LOW MDM: CPT

## 2023-07-12 PROCEDURE — 6370000000 HC RX 637 (ALT 250 FOR IP): Performed by: PHYSICIAN ASSISTANT

## 2023-07-12 RX ORDER — CEPHALEXIN 250 MG/1
500 CAPSULE ORAL ONCE
Status: COMPLETED | OUTPATIENT
Start: 2023-07-12 | End: 2023-07-12

## 2023-07-12 RX ORDER — IBUPROFEN 600 MG/1
600 TABLET ORAL EVERY 6 HOURS PRN
Qty: 20 TABLET | Refills: 0 | Status: SHIPPED | OUTPATIENT
Start: 2023-07-12 | End: 2023-07-17

## 2023-07-12 RX ORDER — CEPHALEXIN 500 MG/1
500 CAPSULE ORAL 4 TIMES DAILY
Qty: 40 CAPSULE | Refills: 0 | Status: SHIPPED | OUTPATIENT
Start: 2023-07-12 | End: 2023-07-22

## 2023-07-12 RX ADMIN — CEPHALEXIN 500 MG: 250 CAPSULE ORAL at 21:37

## 2023-07-12 ASSESSMENT — PAIN DESCRIPTION - LOCATION: LOCATION: FINGER (COMMENT WHICH ONE)

## 2023-07-12 ASSESSMENT — PAIN - FUNCTIONAL ASSESSMENT: PAIN_FUNCTIONAL_ASSESSMENT: 0-10

## 2023-07-12 ASSESSMENT — PAIN SCALES - GENERAL: PAINLEVEL_OUTOF10: 7

## 2023-07-12 ASSESSMENT — PAIN DESCRIPTION - ORIENTATION: ORIENTATION: RIGHT
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
## 2024-02-13 ENCOUNTER — HOSPITAL ENCOUNTER (EMERGENCY)
Age: 44
Discharge: HOME | End: 2024-02-13
Payer: COMMERCIAL

## 2024-02-13 VITALS
RESPIRATION RATE: 20 BRPM | TEMPERATURE: 98 F | SYSTOLIC BLOOD PRESSURE: 125 MMHG | HEART RATE: 86 BPM | OXYGEN SATURATION: 99 % | WEIGHT: 156 LBS | BODY MASS INDEX: 25.96 KG/M2 | DIASTOLIC BLOOD PRESSURE: 64 MMHG

## 2024-02-13 DIAGNOSIS — L03.011 FELON OF FINGER OF RIGHT HAND: Primary | ICD-10-CM

## 2024-02-13 PROCEDURE — 26011 DRAINAGE OF FINGER ABSCESS: CPT

## 2024-02-13 PROCEDURE — 6370000000 HC RX 637 (ALT 250 FOR IP): Performed by: PHYSICIAN ASSISTANT

## 2024-02-13 PROCEDURE — 99283 EMERGENCY DEPT VISIT LOW MDM: CPT

## 2024-02-13 RX ORDER — IBUPROFEN 800 MG/1
800 TABLET ORAL ONCE
Status: COMPLETED | OUTPATIENT
Start: 2024-02-13 | End: 2024-02-13

## 2024-02-13 RX ORDER — SULFAMETHOXAZOLE AND TRIMETHOPRIM 800; 160 MG/1; MG/1
2 TABLET ORAL ONCE
Status: COMPLETED | OUTPATIENT
Start: 2024-02-13 | End: 2024-02-13

## 2024-02-13 RX ORDER — SULFAMETHOXAZOLE AND TRIMETHOPRIM 800; 160 MG/1; MG/1
2 TABLET ORAL 2 TIMES DAILY
Qty: 40 TABLET | Refills: 0 | Status: SHIPPED | OUTPATIENT
Start: 2024-02-13 | End: 2024-02-23

## 2024-02-13 RX ORDER — IBUPROFEN 800 MG/1
800 TABLET ORAL EVERY 8 HOURS PRN
Qty: 15 TABLET | Refills: 0 | Status: SHIPPED | OUTPATIENT
Start: 2024-02-13 | End: 2024-02-18

## 2024-02-13 RX ADMIN — SULFAMETHOXAZOLE AND TRIMETHOPRIM 2 TABLET: 800; 160 TABLET ORAL at 17:15

## 2024-02-13 RX ADMIN — IBUPROFEN 800 MG: 800 TABLET, FILM COATED ORAL at 17:14

## 2024-02-13 NOTE — ED PROVIDER NOTES
Independent RICCARDO Visit.        [facility]  Department of Emergency Medicine   ED  Encounter Note  Admit Date/RoomTime: 2024  4:40 PM  ED Room: [place] [address]    NAME: Troy Daigle  : 1980  MRN: [id number]     Chief Complaint:  Hand Pain    History of Present Illness        Troy Daigle is a 43 y.o. old female who presents to the emergency department by private vehicle, for sudden onset tender area on  Right Little finger distal phalanx radial/palmar aspect, which occured 2 day(s) prior to arrival.  Since onset the symptoms have been worsening and moderate in severity. Symptoms are associated with warmth and swelling She has a history of previous infection which cleared with antibiotics years prior.  Patient denies chest pain, shortness of breath, fever, chills, nausea, vomiting or diarrhea.    ROS   Pertinent positives and negatives are stated within HPI, all other systems reviewed and are negative.    Past Medical History:  has a past medical history of Acute kidney injury (HCC), H/O cardiovascular stress test, Hyperlipemia, Substance abuse (HCC), and Varicose vein.    Surgical History:  has a past surgical history that includes Tubal ligation (2013).    Social History:  reports that she has been smoking cigarettes. She has a 14.0 pack-year smoking history. She uses smokeless tobacco. She reports that she does not currently use drugs. She reports that she does not drink alcohol.    Family History: family history includes COPD in her paternal grandfather; Diabetes in her paternal grandmother and other family members; Heart Disease in her father.     Allergies: Patient has no known allergies.    Physical Exam   Oxygen Saturation Interpretation: Normal.        ED Triage Vitals   BP Temp Temp src Pulse Respirations SpO2 Height Weight - Scale   24 1531 24 1525 -- 24 1525 24 1529 24 1525 -- 24 1529   125/64 98 °F (36.7 °C)  (!) 104 20 99 %  70.8

## 2024-05-03 ENCOUNTER — HOSPITAL ENCOUNTER (OUTPATIENT)
Dept: ULTRASOUND IMAGING | Age: 44
End: 2024-05-03
Payer: COMMERCIAL

## 2024-05-03 ENCOUNTER — HOSPITAL ENCOUNTER (OUTPATIENT)
Age: 44
Discharge: HOME | End: 2024-05-03
Payer: COMMERCIAL

## 2024-05-03 ENCOUNTER — HOSPITAL ENCOUNTER (OUTPATIENT)
Dept: ULTRASOUND IMAGING | Age: 44
Discharge: HOME | End: 2024-05-03
Payer: COMMERCIAL

## 2024-05-03 DIAGNOSIS — B18.2 CHRONIC HEPATITIS C WITH HEPATIC COMA (HCC): ICD-10-CM

## 2024-05-03 LAB
ALBUMIN SERPL-MCNC: 4.6 G/DL (ref 3.5–5.2)
ALP SERPL-CCNC: 84 U/L (ref 35–104)
ALT SERPL-CCNC: 31 U/L (ref 0–32)
ANION GAP SERPL CALCULATED.3IONS-SCNC: 17 MMOL/L (ref 7–16)
AST SERPL-CCNC: 22 U/L (ref 0–31)
BASOPHILS # BLD: 0.06 K/UL (ref 0–0.2)
BASOPHILS NFR BLD: 1 % (ref 0–2)
BILIRUB SERPL-MCNC: 0.3 MG/DL (ref 0–1.2)
BUN SERPL-MCNC: 10 MG/DL (ref 6–20)
CALCIUM SERPL-MCNC: 9.2 MG/DL (ref 8.6–10.2)
CHLORIDE SERPL-SCNC: 102 MMOL/L (ref 98–107)
CO2 SERPL-SCNC: 18 MMOL/L (ref 22–29)
CREAT SERPL-MCNC: 0.8 MG/DL (ref 0.5–1)
EOSINOPHIL # BLD: 0.09 K/UL (ref 0.05–0.5)
EOSINOPHILS RELATIVE PERCENT: 1 % (ref 0–6)
ERYTHROCYTE [DISTWIDTH] IN BLOOD BY AUTOMATED COUNT: 11.8 % (ref 11.5–15)
GFR, ESTIMATED: >90 ML/MIN/1.73M2
GLUCOSE SERPL-MCNC: 83 MG/DL (ref 74–99)
HCT VFR BLD AUTO: 39.3 % (ref 34–48)
HGB BLD-MCNC: 13.8 G/DL (ref 11.5–15.5)
IMM GRANULOCYTES # BLD AUTO: <0.03 K/UL (ref 0–0.58)
IMM GRANULOCYTES NFR BLD: 0 % (ref 0–5)
INR PPP: 1
IRON SATN MFR SERPL: 21 % (ref 15–50)
IRON SERPL-MCNC: 65 UG/DL (ref 37–145)
LYMPHOCYTES NFR BLD: 2.44 K/UL (ref 1.5–4)
LYMPHOCYTES RELATIVE PERCENT: 32 % (ref 20–42)
MCH RBC QN AUTO: 31.4 PG (ref 26–35)
MCHC RBC AUTO-ENTMCNC: 35.1 G/DL (ref 32–34.5)
MCV RBC AUTO: 89.3 FL (ref 80–99.9)
MONOCYTES NFR BLD: 0.58 K/UL (ref 0.1–0.95)
MONOCYTES NFR BLD: 8 % (ref 2–12)
NEUTROPHILS NFR BLD: 58 % (ref 43–80)
NEUTS SEG NFR BLD: 4.37 K/UL (ref 1.8–7.3)
PARTIAL THROMBOPLASTIN TIME: 32.1 SEC (ref 24.5–35.1)
PLATELET # BLD AUTO: 250 K/UL (ref 130–450)
PMV BLD AUTO: 10.3 FL (ref 7–12)
POTASSIUM SERPL-SCNC: 3.6 MMOL/L (ref 3.5–5)
PROT SERPL-MCNC: 7.9 G/DL (ref 6.4–8.3)
PROTHROMBIN TIME: 11.2 SEC (ref 9.3–12.4)
RBC # BLD AUTO: 4.4 M/UL (ref 3.5–5.5)
SODIUM SERPL-SCNC: 137 MMOL/L (ref 132–146)
TIBC SERPL-MCNC: 309 UG/DL (ref 250–450)
WBC OTHER # BLD: 7.6 K/UL (ref 4.5–11.5)

## 2024-05-03 PROCEDURE — 87389 HIV-1 AG W/HIV-1&-2 AB AG IA: CPT

## 2024-05-03 PROCEDURE — 85610 PROTHROMBIN TIME: CPT

## 2024-05-03 PROCEDURE — 87340 HEPATITIS B SURFACE AG IA: CPT

## 2024-05-03 PROCEDURE — 76705 ECHO EXAM OF ABDOMEN: CPT

## 2024-05-03 PROCEDURE — 36415 COLL VENOUS BLD VENIPUNCTURE: CPT

## 2024-05-03 PROCEDURE — 85730 THROMBOPLASTIN TIME PARTIAL: CPT

## 2024-05-03 PROCEDURE — 85025 COMPLETE CBC W/AUTO DIFF WBC: CPT

## 2024-05-03 PROCEDURE — 87522 HEPATITIS C REVRS TRNSCRPJ: CPT

## 2024-05-03 PROCEDURE — 83550 IRON BINDING TEST: CPT

## 2024-05-03 PROCEDURE — 76981 USE PARENCHYMA: CPT

## 2024-05-03 PROCEDURE — 83540 ASSAY OF IRON: CPT

## 2024-05-03 PROCEDURE — 80053 COMPREHEN METABOLIC PANEL: CPT

## 2024-05-03 PROCEDURE — 86803 HEPATITIS C AB TEST: CPT

## 2024-05-03 PROCEDURE — 86704 HEP B CORE ANTIBODY TOTAL: CPT

## 2024-05-03 PROCEDURE — 86317 IMMUNOASSAY INFECTIOUS AGENT: CPT

## 2024-05-06 LAB
HBV SURFACE AB SERPL IA-ACNC: 101.39 MIU/ML (ref 0–9.99)
HBV SURFACE AG SERPL QL IA: NONREACTIVE
HCV AB SERPL QL IA: REACTIVE
HIV 1+2 AB+HIV1 P24 AG SERPL QL IA: NONREACTIVE

## 2024-05-07 LAB — HBV CORE AB SER QL: NONREACTIVE

## 2024-05-08 LAB
HCV RNA # SERPL NAA+PROBE: DETECTED {COPIES}/ML
HCV RNA SERPL NAA+PROBE-ACNC: ABNORMAL IU/ML
HCV RNA SERPL NAA+PROBE-LOG IU: 6.16 LOG IU/ML
SPECIMEN SOURCE: ABNORMAL